# Patient Record
Sex: FEMALE
[De-identification: names, ages, dates, MRNs, and addresses within clinical notes are randomized per-mention and may not be internally consistent; named-entity substitution may affect disease eponyms.]

---

## 2017-01-14 ENCOUNTER — DIAGNOSTIC TRANS (OUTPATIENT)
Dept: OTHER | Age: 82
End: 2017-01-14

## 2017-01-14 ENCOUNTER — HOSPITAL (OUTPATIENT)
Dept: OTHER | Age: 82
End: 2017-01-14
Attending: HOSPITALIST

## 2017-01-14 LAB
ALBUMIN SERPL-MCNC: 3.5 GM/DL (ref 3.6–5.1)
ALBUMIN/GLOB SERPL: 0.8 {RATIO} (ref 1–2.4)
ALP SERPL-CCNC: 69 UNIT/L (ref 45–117)
ALT SERPL-CCNC: 15 UNIT/L
AMORPH SED URNS QL MICRO: ABNORMAL
ANALYZER ANC (IANC): ABNORMAL
ANION GAP SERPL CALC-SCNC: 11 MMOL/L (ref 10–20)
APPEARANCE UR: ABNORMAL
AST SERPL-CCNC: 14 UNIT/L
BACTERIA #/AREA URNS HPF: ABNORMAL /HPF
BASOPHILS # BLD: 0 THOUSAND/MCL (ref 0–0.3)
BASOPHILS NFR BLD: 0 %
BILIRUB SERPL-MCNC: 0.4 MG/DL (ref 0.2–1)
BILIRUB UR QL: NEGATIVE
BNP SERPL-MCNC: 231 PG/ML
BUN SERPL-MCNC: 9 MG/DL (ref 10–20)
BUN/CREAT SERPL: 13 (ref 7–25)
CALCIUM SERPL-MCNC: 8.5 MG/DL (ref 8.4–10.2)
CAOX CRY URNS QL MICRO: ABNORMAL
CHLORIDE: 95 MMOL/L (ref 98–107)
CO2 SERPL-SCNC: 31 MMOL/L (ref 21–32)
COLOR UR: YELLOW
CREAT SERPL-MCNC: 0.68 MG/DL (ref 0.51–0.95)
DIFFERENTIAL METHOD BLD: ABNORMAL
EOSINOPHIL # BLD: 0 THOUSAND/MCL (ref 0.1–0.5)
EOSINOPHIL NFR BLD: 0 %
EPITH CASTS #/AREA URNS LPF: ABNORMAL /[LPF]
ERYTHROCYTE [DISTWIDTH] IN BLOOD: 13.3 % (ref 11–15)
ERYTHROCYTE [SEDIMENTATION RATE] IN BLOOD BY WESTERGREN METHOD: 28 MM/HR (ref 0–20)
FATTY CASTS #/AREA URNS LPF: ABNORMAL /[LPF]
GLOBULIN SER-MCNC: 4.2 GM/DL (ref 2–4)
GLUCOSE SERPL-MCNC: 123 MG/DL (ref 65–99)
GLUCOSE UR-MCNC: NEGATIVE MG/DL
GRAN CASTS #/AREA URNS LPF: ABNORMAL /[LPF]
HEMATOCRIT: 41.9 % (ref 36–46.5)
HGB BLD-MCNC: 13.9 GM/DL (ref 12–15.5)
HGB UR QL: NEGATIVE
HYALINE CASTS #/AREA URNS LPF: ABNORMAL /LPF (ref 0–5)
KETONES UR-MCNC: NEGATIVE MG/DL
LEUKOCYTE ESTERASE UR QL STRIP: NEGATIVE
LYMPHOCYTES # BLD: 1.1 THOUSAND/MCL (ref 1–4)
LYMPHOCYTES NFR BLD: 10 %
MCH RBC QN AUTO: 28.7 PG (ref 26–34)
MCHC RBC AUTO-ENTMCNC: 33.2 GM/DL (ref 32–36.5)
MCV RBC AUTO: 86.6 FL (ref 78–100)
MIXED CELL CASTS #/AREA URNS LPF: ABNORMAL /[LPF]
MONOCYTES # BLD: 0.5 THOUSAND/MCL (ref 0.3–0.9)
MONOCYTES NFR BLD: 4 %
MUCOUS THREADS URNS QL MICRO: PRESENT
NEUTROPHILS # BLD: 10.2 THOUSAND/MCL (ref 1.8–7.7)
NEUTROPHILS NFR BLD: 86 %
NEUTS SEG NFR BLD: ABNORMAL %
NITRITE UR QL: NEGATIVE
PERCENT NRBC: ABNORMAL
PH UR: 7 UNIT (ref 5–7)
PLATELET # BLD: 277 THOUSAND/MCL (ref 140–450)
POTASSIUM SERPL-SCNC: 3.4 MMOL/L (ref 3.4–5.1)
PROT SERPL-MCNC: 7.7 GM/DL (ref 6.4–8.2)
PROT UR QL: 100 MG/DL
RBC # BLD: 4.84 MILLION/MCL (ref 4–5.2)
RBC #/AREA URNS HPF: ABNORMAL /HPF (ref 0–3)
RBC CASTS #/AREA URNS LPF: ABNORMAL /[LPF]
RENAL EPI CELLS #/AREA URNS HPF: ABNORMAL /[HPF]
SODIUM SERPL-SCNC: 134 MMOL/L (ref 135–145)
SP GR UR: 1.02 (ref 1–1.03)
SPECIMEN SOURCE: ABNORMAL
SPERM URNS QL MICRO: ABNORMAL
SQUAMOUS #/AREA URNS HPF: ABNORMAL /HPF (ref 0–5)
T VAGINALIS URNS QL MICRO: ABNORMAL
TRI-PHOS CRY URNS QL MICRO: ABNORMAL
URATE CRY URNS QL MICRO: ABNORMAL
URINE REFLEX: ABNORMAL
URNS CMNT MICRO: ABNORMAL
UROBILINOGEN UR QL: 0.2 MG/DL (ref 0–1)
WAXY CASTS #/AREA URNS LPF: ABNORMAL /[LPF]
WBC # BLD: 11.8 THOUSAND/MCL (ref 4.2–11)
WBC #/AREA URNS HPF: ABNORMAL /HPF (ref 0–5)
WBC CASTS #/AREA URNS LPF: ABNORMAL /[LPF]
YEAST HYPHAE URNS QL MICRO: ABNORMAL
YEAST URNS QL MICRO: ABNORMAL

## 2017-01-15 LAB
ANALYZER ANC (IANC): ABNORMAL
ANION GAP SERPL CALC-SCNC: 11 MMOL/L (ref 10–20)
BASOPHILS # BLD: 0 THOUSAND/MCL (ref 0–0.3)
BASOPHILS NFR BLD: 0 %
BUN SERPL-MCNC: 10 MG/DL (ref 10–20)
BUN/CREAT SERPL: 13 (ref 7–25)
CALCIUM SERPL-MCNC: 8.3 MG/DL (ref 8.4–10.2)
CHLORIDE: 99 MMOL/L (ref 98–107)
CO2 SERPL-SCNC: 29 MMOL/L (ref 21–32)
CREAT SERPL-MCNC: 0.76 MG/DL (ref 0.51–0.95)
DIFFERENTIAL METHOD BLD: ABNORMAL
EOSINOPHIL # BLD: 0.1 THOUSAND/MCL (ref 0.1–0.5)
EOSINOPHIL NFR BLD: 1 %
ERYTHROCYTE [DISTWIDTH] IN BLOOD: 13.9 % (ref 11–15)
GLUCOSE SERPL-MCNC: 111 MG/DL (ref 65–99)
HEMATOCRIT: 37.4 % (ref 36–46.5)
HGB BLD-MCNC: 12 GM/DL (ref 12–15.5)
LYMPHOCYTES # BLD: 2.8 THOUSAND/MCL (ref 1–4)
LYMPHOCYTES NFR BLD: 28 %
MAGNESIUM SERPL-MCNC: 1.9 MG/DL (ref 1.7–2.4)
MCH RBC QN AUTO: 28.2 PG (ref 26–34)
MCHC RBC AUTO-ENTMCNC: 32.1 GM/DL (ref 32–36.5)
MCV RBC AUTO: 87.8 FL (ref 78–100)
MONOCYTES # BLD: 1.2 THOUSAND/MCL (ref 0.3–0.9)
MONOCYTES NFR BLD: 12 %
NEUTROPHILS # BLD: 5.8 THOUSAND/MCL (ref 1.8–7.7)
NEUTROPHILS NFR BLD: 59 %
NEUTS SEG NFR BLD: ABNORMAL %
PERCENT NRBC: ABNORMAL
PLATELET # BLD: 232 THOUSAND/MCL (ref 140–450)
POTASSIUM SERPL-SCNC: 3.4 MMOL/L (ref 3.4–5.1)
RBC # BLD: 4.26 MILLION/MCL (ref 4–5.2)
SODIUM SERPL-SCNC: 136 MMOL/L (ref 135–145)
WBC # BLD: 9.9 THOUSAND/MCL (ref 4.2–11)

## 2017-01-24 PROBLEM — G43.801 OCULAR MIGRAINE WITH STATUS MIGRAINOSUS: Status: ACTIVE | Noted: 2017-01-24

## 2017-01-24 PROBLEM — I67.9 CEREBRAL VASCULAR DISEASE: Status: ACTIVE | Noted: 2017-01-24

## 2017-01-29 ENCOUNTER — HOSPITAL (OUTPATIENT)
Dept: OTHER | Age: 82
End: 2017-01-29
Attending: INTERNAL MEDICINE

## 2017-01-29 LAB
AMORPH SED URNS QL MICRO: ABNORMAL
ANALYZER ANC (IANC): NORMAL
ANION GAP SERPL CALC-SCNC: 12 MMOL/L (ref 10–20)
APPEARANCE UR: CLEAR
BACTERIA #/AREA URNS HPF: ABNORMAL /HPF
BASOPHILS # BLD: 0 THOUSAND/MCL (ref 0–0.3)
BASOPHILS NFR BLD: 1 %
BILIRUB UR QL: NEGATIVE
BUN SERPL-MCNC: 14 MG/DL (ref 10–20)
BUN/CREAT SERPL: 19 (ref 7–25)
CALCIUM SERPL-MCNC: 8.8 MG/DL (ref 8.4–10.2)
CAOX CRY URNS QL MICRO: ABNORMAL
CHLORIDE: 104 MMOL/L (ref 98–107)
CO2 SERPL-SCNC: 30 MMOL/L (ref 21–32)
COLOR UR: YELLOW
CREAT SERPL-MCNC: 0.75 MG/DL (ref 0.51–0.95)
DIFFERENTIAL METHOD BLD: NORMAL
EOSINOPHIL # BLD: 0.3 THOUSAND/MCL (ref 0.1–0.5)
EOSINOPHIL NFR BLD: 3 %
EPITH CASTS #/AREA URNS LPF: ABNORMAL /[LPF]
ERYTHROCYTE [DISTWIDTH] IN BLOOD: 13.8 % (ref 11–15)
FATTY CASTS #/AREA URNS LPF: ABNORMAL /[LPF]
GLUCOSE BLDC GLUCOMTR-MCNC: 122 MG/DL (ref 65–99)
GLUCOSE SERPL-MCNC: 125 MG/DL (ref 65–99)
GLUCOSE UR-MCNC: NEGATIVE MG/DL
GRAN CASTS #/AREA URNS LPF: ABNORMAL /[LPF]
HEMATOCRIT: 41.7 % (ref 36–46.5)
HGB BLD-MCNC: 13.7 GM/DL (ref 12–15.5)
HGB UR QL: NEGATIVE
HYALINE CASTS #/AREA URNS LPF: ABNORMAL /LPF (ref 0–5)
KETONES UR-MCNC: NEGATIVE MG/DL
LEUKOCYTE ESTERASE UR QL STRIP: ABNORMAL
LYMPHOCYTES # BLD: 2.1 THOUSAND/MCL (ref 1–4)
LYMPHOCYTES NFR BLD: 24 %
MCH RBC QN AUTO: 29 PG (ref 26–34)
MCHC RBC AUTO-ENTMCNC: 32.9 GM/DL (ref 32–36.5)
MCV RBC AUTO: 88.3 FL (ref 78–100)
MIXED CELL CASTS #/AREA URNS LPF: ABNORMAL /[LPF]
MONOCYTES # BLD: 0.9 THOUSAND/MCL (ref 0.3–0.9)
MONOCYTES NFR BLD: 10 %
MUCOUS THREADS URNS QL MICRO: PRESENT
NEUTROPHILS # BLD: 5.5 THOUSAND/MCL (ref 1.8–7.7)
NEUTROPHILS NFR BLD: 62 %
NEUTS SEG NFR BLD: NORMAL %
NITRITE UR QL: NEGATIVE
PERCENT NRBC: NORMAL
PH UR: 6 UNIT (ref 5–7)
PLATELET # BLD: 252 THOUSAND/MCL (ref 140–450)
POTASSIUM SERPL-SCNC: 3.7 MMOL/L (ref 3.4–5.1)
PROT UR QL: NEGATIVE MG/DL
RBC # BLD: 4.72 MILLION/MCL (ref 4–5.2)
RBC #/AREA URNS HPF: ABNORMAL /HPF (ref 0–3)
RBC CASTS #/AREA URNS LPF: ABNORMAL /[LPF]
RENAL EPI CELLS #/AREA URNS HPF: ABNORMAL /[HPF]
SODIUM SERPL-SCNC: 142 MMOL/L (ref 135–145)
SP GR UR: 1.01 (ref 1–1.03)
SPECIMEN SOURCE: ABNORMAL
SPERM URNS QL MICRO: ABNORMAL
SQUAMOUS #/AREA URNS HPF: ABNORMAL /HPF (ref 0–5)
T VAGINALIS URNS QL MICRO: ABNORMAL
TRI-PHOS CRY URNS QL MICRO: ABNORMAL
TSH SERPL-ACNC: 3.78 MCUNIT/ML (ref 0.35–5)
URATE CRY URNS QL MICRO: ABNORMAL
URINE REFLEX: ABNORMAL
URNS CMNT MICRO: ABNORMAL
UROBILINOGEN UR QL: 0.2 MG/DL (ref 0–1)
WAXY CASTS #/AREA URNS LPF: ABNORMAL /[LPF]
WBC # BLD: 8.8 THOUSAND/MCL (ref 4.2–11)
WBC #/AREA URNS HPF: ABNORMAL /HPF (ref 0–5)
WBC CASTS #/AREA URNS LPF: ABNORMAL /[LPF]
YEAST HYPHAE URNS QL MICRO: ABNORMAL
YEAST URNS QL MICRO: ABNORMAL

## 2017-01-30 ENCOUNTER — DIAGNOSTIC TRANS (OUTPATIENT)
Dept: OTHER | Age: 82
End: 2017-01-30

## 2017-01-30 LAB
ANALYZER ANC (IANC): NORMAL
ANION GAP SERPL CALC-SCNC: 10 MMOL/L (ref 10–20)
BASOPHILS # BLD: 0 THOUSAND/MCL (ref 0–0.3)
BASOPHILS NFR BLD: 0 %
BUN SERPL-MCNC: 12 MG/DL (ref 10–20)
BUN/CREAT SERPL: 18 (ref 7–25)
CALCIUM SERPL-MCNC: 8.9 MG/DL (ref 8.4–10.2)
CHLORIDE: 105 MMOL/L (ref 98–107)
CHOLEST SERPL-MCNC: 133 MG/DL
CHOLEST/HDLC SERPL: 2.3 {RATIO}
CO2 SERPL-SCNC: 31 MMOL/L (ref 21–32)
CREAT SERPL-MCNC: 0.65 MG/DL (ref 0.51–0.95)
DIFFERENTIAL METHOD BLD: NORMAL
EOSINOPHIL # BLD: 0.3 THOUSAND/MCL (ref 0.1–0.5)
EOSINOPHIL NFR BLD: 4 %
ERYTHROCYTE [DISTWIDTH] IN BLOOD: 13.9 % (ref 11–15)
ERYTHROCYTE [SEDIMENTATION RATE] IN BLOOD BY WESTERGREN METHOD: 25 MM/HR (ref 0–20)
GLUCOSE SERPL-MCNC: 124 MG/DL (ref 65–99)
HDLC SERPL-MCNC: 58 MG/DL
HEMATOCRIT: 40.9 % (ref 36–46.5)
HGB BLD-MCNC: 13.1 GM/DL (ref 12–15.5)
LDLC SERPL CALC-MCNC: 61 MG/DL
LYMPHOCYTES # BLD: 2.2 THOUSAND/MCL (ref 1–4)
LYMPHOCYTES NFR BLD: 27 %
MCH RBC QN AUTO: 28.4 PG (ref 26–34)
MCHC RBC AUTO-ENTMCNC: 32 GM/DL (ref 32–36.5)
MCV RBC AUTO: 88.5 FL (ref 78–100)
MONOCYTES # BLD: 0.9 THOUSAND/MCL (ref 0.3–0.9)
MONOCYTES NFR BLD: 11 %
NEUTROPHILS # BLD: 4.7 THOUSAND/MCL (ref 1.8–7.7)
NEUTROPHILS NFR BLD: 58 %
NEUTS SEG NFR BLD: NORMAL %
NONHDLC SERPL-MCNC: 75 MG/DL
PERCENT NRBC: NORMAL
PLATELET # BLD: 246 THOUSAND/MCL (ref 140–450)
POTASSIUM SERPL-SCNC: 3.9 MMOL/L (ref 3.4–5.1)
RBC # BLD: 4.62 MILLION/MCL (ref 4–5.2)
SODIUM SERPL-SCNC: 142 MMOL/L (ref 135–145)
TRIGL SERPL-MCNC: 69 MG/DL
WBC # BLD: 8.1 THOUSAND/MCL (ref 4.2–11)

## 2017-01-31 LAB
ALDOST/RENIN PLAS-RTO: 21.6 {RATIO}
ALDOSTERONE, SERUM: 6.5
ANALYZER ANC (IANC): NORMAL
ANION GAP SERPL CALC-SCNC: 11 MMOL/L (ref 10–20)
BASOPHILS # BLD: 0 THOUSAND/MCL (ref 0–0.3)
BASOPHILS NFR BLD: 0 %
BUN SERPL-MCNC: 14 MG/DL (ref 10–20)
BUN/CREAT SERPL: 19 (ref 7–25)
CALCIUM SERPL-MCNC: 8.8 MG/DL (ref 8.4–10.2)
CHLORIDE: 100 MMOL/L (ref 98–107)
CO2 SERPL-SCNC: 30 MMOL/L (ref 21–32)
CREAT SERPL-MCNC: 0.72 MG/DL (ref 0.51–0.95)
DIFFERENTIAL METHOD BLD: NORMAL
EOSINOPHIL # BLD: 0.2 THOUSAND/MCL (ref 0.1–0.5)
EOSINOPHIL NFR BLD: 1 %
ERYTHROCYTE [DISTWIDTH] IN BLOOD: 14.3 % (ref 11–15)
GLUCOSE SERPL-MCNC: 123 MG/DL (ref 65–99)
HEMATOCRIT: 41 % (ref 36–46.5)
HGB BLD-MCNC: 13.2 GM/DL (ref 12–15.5)
LYMPHOCYTES # BLD: 2.5 THOUSAND/MCL (ref 1–4)
LYMPHOCYTES NFR BLD: 23 %
MCH RBC QN AUTO: 28.1 PG (ref 26–34)
MCHC RBC AUTO-ENTMCNC: 32.2 GM/DL (ref 32–36.5)
MCV RBC AUTO: 87.4 FL (ref 78–100)
MONOCYTES # BLD: 0.8 THOUSAND/MCL (ref 0.3–0.9)
MONOCYTES NFR BLD: 7 %
NEUTROPHILS # BLD: 7.3 THOUSAND/MCL (ref 1.8–7.7)
NEUTROPHILS NFR BLD: 69 %
NEUTS SEG NFR BLD: NORMAL %
ORGANIC ACIDS/CREAT UR-SRTO: 215.98 MG/DL
PERCENT NRBC: NORMAL
PLATELET # BLD: 267 THOUSAND/MCL (ref 140–450)
POTASSIUM SERPL-SCNC: 3.4 MMOL/L (ref 3.4–5.1)
PROT UR-MCNC: 32 MG/DL
PROT/CREAT UR: 148 MG/GM CREAT
RBC # BLD: 4.69 MILLION/MCL (ref 4–5.2)
RENIN ACTIVITY: 0.3
SODIUM SERPL-SCNC: 138 MMOL/L (ref 135–145)
WBC # BLD: 10.7 THOUSAND/MCL (ref 4.2–11)

## 2017-02-01 ENCOUNTER — HOSPITAL (OUTPATIENT)
Dept: OTHER | Age: 82
End: 2017-02-01
Attending: EMERGENCY MEDICINE

## 2017-02-01 ENCOUNTER — DIAGNOSTIC TRANS (OUTPATIENT)
Dept: OTHER | Age: 82
End: 2017-02-01

## 2017-02-01 LAB
ANALYZER ANC (IANC): ABNORMAL
ANION GAP SERPL CALC-SCNC: 14 MMOL/L (ref 10–20)
BASOPHILS # BLD: 0.1 THOUSAND/MCL (ref 0–0.3)
BASOPHILS NFR BLD: 0 %
BUN SERPL-MCNC: 23 MG/DL (ref 10–20)
BUN/CREAT SERPL: 29 (ref 7–25)
CALCIUM SERPL-MCNC: 9.2 MG/DL (ref 8.4–10.2)
CHLORIDE: 98 MMOL/L (ref 98–107)
CO2 SERPL-SCNC: 29 MMOL/L (ref 21–32)
CREAT SERPL-MCNC: 0.8 MG/DL (ref 0.51–0.95)
DIFFERENTIAL METHOD BLD: ABNORMAL
EOSINOPHIL # BLD: 0.3 THOUSAND/MCL (ref 0.1–0.5)
EOSINOPHIL NFR BLD: 3 %
ERYTHROCYTE [DISTWIDTH] IN BLOOD: 14.4 % (ref 11–15)
GLUCOSE SERPL-MCNC: 115 MG/DL (ref 65–99)
HEMATOCRIT: 43.5 % (ref 36–46.5)
HGB BLD-MCNC: 14.4 GM/DL (ref 12–15.5)
LYMPHOCYTES # BLD: 3.3 THOUSAND/MCL (ref 1–4)
LYMPHOCYTES NFR BLD: 29 %
MCH RBC QN AUTO: 29.2 PG (ref 26–34)
MCHC RBC AUTO-ENTMCNC: 33.1 GM/DL (ref 32–36.5)
MCV RBC AUTO: 88.2 FL (ref 78–100)
MONOCYTES # BLD: 1.2 THOUSAND/MCL (ref 0.3–0.9)
MONOCYTES NFR BLD: 11 %
NEUTROPHILS # BLD: 6.6 THOUSAND/MCL (ref 1.8–7.7)
NEUTROPHILS NFR BLD: 57 %
NEUTS SEG NFR BLD: ABNORMAL %
PERCENT NRBC: ABNORMAL
PLATELET # BLD: 287 THOUSAND/MCL (ref 140–450)
POTASSIUM SERPL-SCNC: 3.9 MMOL/L (ref 3.4–5.1)
RBC # BLD: 4.93 MILLION/MCL (ref 4–5.2)
SODIUM SERPL-SCNC: 137 MMOL/L (ref 135–145)
TROPONIN I SERPL HS-MCNC: 0.02 NG/ML
WBC # BLD: 11.5 THOUSAND/MCL (ref 4.2–11)

## 2017-02-14 PROBLEM — I70.90: Status: ACTIVE | Noted: 2017-02-14

## 2017-03-21 PROBLEM — T46.4X5A COUGH DUE TO ACE INHIBITOR: Status: ACTIVE | Noted: 2017-03-21

## 2017-03-21 PROBLEM — R05.8 COUGH DUE TO ACE INHIBITOR: Status: ACTIVE | Noted: 2017-03-21

## 2017-07-25 PROBLEM — E55.9 VITAMIN D DEFICIENCY: Status: ACTIVE | Noted: 2017-07-25

## 2017-09-12 PROCEDURE — 87086 URINE CULTURE/COLONY COUNT: CPT | Performed by: INTERNAL MEDICINE

## 2017-09-12 PROCEDURE — 81001 URINALYSIS AUTO W/SCOPE: CPT | Performed by: INTERNAL MEDICINE

## 2018-07-23 PROBLEM — M17.0 BILATERAL PRIMARY OSTEOARTHRITIS OF KNEE: Status: ACTIVE | Noted: 2018-07-23

## 2018-08-16 PROBLEM — Z66 DNR (DO NOT RESUSCITATE): Status: ACTIVE | Noted: 2018-08-16

## 2018-08-16 PROBLEM — F33.41 RECURRENT MAJOR DEPRESSIVE DISORDER, IN PARTIAL REMISSION (HCC): Status: ACTIVE | Noted: 2018-08-16

## 2019-05-24 PROBLEM — R06.02 SOB (SHORTNESS OF BREATH): Status: ACTIVE | Noted: 2019-05-24

## 2020-02-28 PROBLEM — M17.0 PRIMARY OSTEOARTHRITIS OF BOTH KNEES: Status: ACTIVE | Noted: 2020-02-28

## 2020-09-10 PROBLEM — I49.5 TACHYCARDIA-BRADYCARDIA SYNDROME (HCC): Status: ACTIVE | Noted: 2020-09-10

## 2021-05-17 PROBLEM — R06.02 SOB (SHORTNESS OF BREATH): Status: RESOLVED | Noted: 2019-05-24 | Resolved: 2021-05-17

## 2021-05-17 PROBLEM — I70.90: Status: RESOLVED | Noted: 2017-02-14 | Resolved: 2021-05-17

## 2021-05-17 PROBLEM — I67.9 CEREBRAL VASCULAR DISEASE: Status: RESOLVED | Noted: 2017-01-24 | Resolved: 2021-05-17

## 2021-08-27 PROBLEM — R06.02 SHORTNESS OF BREATH: Status: ACTIVE | Noted: 2019-05-24

## 2024-07-01 ENCOUNTER — APPOINTMENT (OUTPATIENT)
Dept: CV DIAGNOSTICS | Facility: HOSPITAL | Age: 89
End: 2024-07-01
Attending: INTERNAL MEDICINE
Payer: MEDICARE

## 2024-07-01 ENCOUNTER — APPOINTMENT (OUTPATIENT)
Dept: CT IMAGING | Facility: HOSPITAL | Age: 89
End: 2024-07-01
Attending: EMERGENCY MEDICINE
Payer: MEDICARE

## 2024-07-01 ENCOUNTER — APPOINTMENT (OUTPATIENT)
Dept: ULTRASOUND IMAGING | Facility: HOSPITAL | Age: 89
End: 2024-07-01
Attending: EMERGENCY MEDICINE
Payer: MEDICARE

## 2024-07-01 ENCOUNTER — HOSPITAL ENCOUNTER (INPATIENT)
Facility: HOSPITAL | Age: 89
LOS: 5 days | Discharge: NURSING, REHAB OR LONG-TERM CARE | End: 2024-07-06
Attending: EMERGENCY MEDICINE | Admitting: EMERGENCY MEDICINE
Payer: MEDICARE

## 2024-07-01 ENCOUNTER — APPOINTMENT (OUTPATIENT)
Dept: GENERAL RADIOLOGY | Facility: HOSPITAL | Age: 89
End: 2024-07-01
Attending: EMERGENCY MEDICINE
Payer: MEDICARE

## 2024-07-01 DIAGNOSIS — M79.89 LEG SWELLING: ICD-10-CM

## 2024-07-01 DIAGNOSIS — R06.02 SHORTNESS OF BREATH: Primary | ICD-10-CM

## 2024-07-01 DIAGNOSIS — J90 PLEURAL EFFUSION ON RIGHT: ICD-10-CM

## 2024-07-01 DIAGNOSIS — R09.02 HYPOXIA: ICD-10-CM

## 2024-07-01 DIAGNOSIS — M17.0 BILATERAL PRIMARY OSTEOARTHRITIS OF KNEE: ICD-10-CM

## 2024-07-01 PROBLEM — I50.9 ACUTE EXACERBATION OF CHF (CONGESTIVE HEART FAILURE) (HCC): Status: ACTIVE | Noted: 2024-07-01

## 2024-07-01 LAB
ANION GAP SERPL CALC-SCNC: 7 MMOL/L (ref 0–18)
ATRIAL RATE: 150 BPM
ATRIAL RATE: 82 BPM
BASOPHILS # BLD AUTO: 0.04 X10(3) UL (ref 0–0.2)
BASOPHILS NFR BLD AUTO: 0.4 %
BNP SERPL-MCNC: 289 PG/ML
BUN BLD-MCNC: 25 MG/DL (ref 9–23)
BUN/CREAT SERPL: 23.6 (ref 10–20)
CALCIUM BLD-MCNC: 9.3 MG/DL (ref 8.7–10.4)
CHLORIDE SERPL-SCNC: 102 MMOL/L (ref 98–112)
CO2 SERPL-SCNC: 27 MMOL/L (ref 21–32)
CREAT BLD-MCNC: 1.06 MG/DL
D DIMER PPP FEU-MCNC: 2.69 UG/ML FEU (ref ?–0.96)
DEPRECATED RDW RBC AUTO: 49.1 FL (ref 35.1–46.3)
EGFRCR SERPLBLD CKD-EPI 2021: 48 ML/MIN/1.73M2 (ref 60–?)
EOSINOPHIL # BLD AUTO: 0.14 X10(3) UL (ref 0–0.7)
EOSINOPHIL NFR BLD AUTO: 1.4 %
ERYTHROCYTE [DISTWIDTH] IN BLOOD BY AUTOMATED COUNT: 15.7 % (ref 11–15)
FLUAV + FLUBV RNA SPEC NAA+PROBE: NEGATIVE
FLUAV + FLUBV RNA SPEC NAA+PROBE: NEGATIVE
GLUCOSE BLD-MCNC: 111 MG/DL (ref 70–99)
HCT VFR BLD AUTO: 39.6 %
HGB BLD-MCNC: 13 G/DL
IMM GRANULOCYTES # BLD AUTO: 0.03 X10(3) UL (ref 0–1)
IMM GRANULOCYTES NFR BLD: 0.3 %
LACTATE SERPL-SCNC: 0.9 MMOL/L (ref 0.5–2)
LYMPHOCYTES # BLD AUTO: 1.78 X10(3) UL (ref 1–4)
LYMPHOCYTES NFR BLD AUTO: 17.9 %
MCH RBC QN AUTO: 28.6 PG (ref 26–34)
MCHC RBC AUTO-ENTMCNC: 32.8 G/DL (ref 31–37)
MCV RBC AUTO: 87 FL
MONOCYTES # BLD AUTO: 0.73 X10(3) UL (ref 0.1–1)
MONOCYTES NFR BLD AUTO: 7.3 %
NEUTROPHILS # BLD AUTO: 7.22 X10 (3) UL (ref 1.5–7.7)
NEUTROPHILS # BLD AUTO: 7.22 X10(3) UL (ref 1.5–7.7)
NEUTROPHILS NFR BLD AUTO: 72.7 %
OSMOLALITY SERPL CALC.SUM OF ELEC: 287 MOSM/KG (ref 275–295)
P AXIS: 73 DEGREES
P AXIS: 84 DEGREES
PLATELET # BLD AUTO: 341 10(3)UL (ref 150–450)
POTASSIUM SERPL-SCNC: 4.8 MMOL/L (ref 3.5–5.1)
Q-T INTERVAL: 352 MS
Q-T INTERVAL: 366 MS
QRS DURATION: 82 MS
QRS DURATION: 98 MS
QTC CALCULATION (BEZET): 406 MS
QTC CALCULATION (BEZET): 413 MS
R AXIS: -11 DEGREES
R AXIS: -19 DEGREES
RBC # BLD AUTO: 4.55 X10(6)UL
RSV RNA SPEC NAA+PROBE: NEGATIVE
SARS-COV-2 RNA RESP QL NAA+PROBE: NOT DETECTED
SODIUM SERPL-SCNC: 136 MMOL/L (ref 136–145)
T AXIS: 0 DEGREES
T AXIS: 69 DEGREES
TROPONIN I SERPL HS-MCNC: 17 NG/L
VENTRICULAR RATE: 74 BPM
VENTRICULAR RATE: 83 BPM
WBC # BLD AUTO: 9.9 X10(3) UL (ref 4–11)

## 2024-07-01 PROCEDURE — 96375 TX/PRO/DX INJ NEW DRUG ADDON: CPT

## 2024-07-01 PROCEDURE — 93005 ELECTROCARDIOGRAM TRACING: CPT

## 2024-07-01 PROCEDURE — 80048 BASIC METABOLIC PNL TOTAL CA: CPT | Performed by: EMERGENCY MEDICINE

## 2024-07-01 PROCEDURE — 84484 ASSAY OF TROPONIN QUANT: CPT | Performed by: EMERGENCY MEDICINE

## 2024-07-01 PROCEDURE — 83880 ASSAY OF NATRIURETIC PEPTIDE: CPT | Performed by: EMERGENCY MEDICINE

## 2024-07-01 PROCEDURE — 93970 EXTREMITY STUDY: CPT | Performed by: EMERGENCY MEDICINE

## 2024-07-01 PROCEDURE — 99285 EMERGENCY DEPT VISIT HI MDM: CPT

## 2024-07-01 PROCEDURE — 71045 X-RAY EXAM CHEST 1 VIEW: CPT | Performed by: EMERGENCY MEDICINE

## 2024-07-01 PROCEDURE — 87040 BLOOD CULTURE FOR BACTERIA: CPT | Performed by: EMERGENCY MEDICINE

## 2024-07-01 PROCEDURE — 96365 THER/PROPH/DIAG IV INF INIT: CPT

## 2024-07-01 PROCEDURE — 93306 TTE W/DOPPLER COMPLETE: CPT | Performed by: INTERNAL MEDICINE

## 2024-07-01 PROCEDURE — 71260 CT THORAX DX C+: CPT | Performed by: EMERGENCY MEDICINE

## 2024-07-01 PROCEDURE — 85025 COMPLETE CBC W/AUTO DIFF WBC: CPT | Performed by: EMERGENCY MEDICINE

## 2024-07-01 PROCEDURE — 93010 ELECTROCARDIOGRAM REPORT: CPT | Performed by: INTERNAL MEDICINE

## 2024-07-01 PROCEDURE — 93010 ELECTROCARDIOGRAM REPORT: CPT

## 2024-07-01 PROCEDURE — 85379 FIBRIN DEGRADATION QUANT: CPT | Performed by: EMERGENCY MEDICINE

## 2024-07-01 PROCEDURE — 0241U SARS-COV-2/FLU A AND B/RSV BY PCR (GENEXPERT): CPT | Performed by: EMERGENCY MEDICINE

## 2024-07-01 PROCEDURE — 83605 ASSAY OF LACTIC ACID: CPT | Performed by: EMERGENCY MEDICINE

## 2024-07-01 RX ORDER — ASPIRIN 81 MG/1
81 TABLET, CHEWABLE ORAL DAILY
COMMUNITY
End: 2024-07-06

## 2024-07-01 RX ORDER — HYDROMORPHONE HYDROCHLORIDE 1 MG/ML
0.8 INJECTION, SOLUTION INTRAMUSCULAR; INTRAVENOUS; SUBCUTANEOUS EVERY 2 HOUR PRN
Status: DISCONTINUED | OUTPATIENT
Start: 2024-07-01 | End: 2024-07-02

## 2024-07-01 RX ORDER — ASPIRIN 81 MG/1
81 TABLET, CHEWABLE ORAL DAILY
Status: DISCONTINUED | OUTPATIENT
Start: 2024-07-01 | End: 2024-07-05

## 2024-07-01 RX ORDER — SENNOSIDES 8.6 MG
17.2 TABLET ORAL NIGHTLY PRN
Status: DISCONTINUED | OUTPATIENT
Start: 2024-07-01 | End: 2024-07-06

## 2024-07-01 RX ORDER — DOCUSATE SODIUM 100 MG/1
100 CAPSULE, LIQUID FILLED ORAL 2 TIMES DAILY
Status: DISCONTINUED | OUTPATIENT
Start: 2024-07-01 | End: 2024-07-06

## 2024-07-01 RX ORDER — ENEMA 19; 7 G/133ML; G/133ML
1 ENEMA RECTAL ONCE AS NEEDED
Status: DISCONTINUED | OUTPATIENT
Start: 2024-07-01 | End: 2024-07-06

## 2024-07-01 RX ORDER — AMLODIPINE BESYLATE 5 MG/1
5 TABLET ORAL 2 TIMES DAILY
Status: DISCONTINUED | OUTPATIENT
Start: 2024-07-01 | End: 2024-07-06

## 2024-07-01 RX ORDER — SPIRONOLACTONE 25 MG/1
25 TABLET ORAL DAILY
COMMUNITY

## 2024-07-01 RX ORDER — PANTOPRAZOLE SODIUM 20 MG/1
20 TABLET, DELAYED RELEASE ORAL
Status: DISCONTINUED | OUTPATIENT
Start: 2024-07-01 | End: 2024-07-06

## 2024-07-01 RX ORDER — FUROSEMIDE 20 MG/1
20 TABLET ORAL DAILY
Status: ON HOLD | COMMUNITY
End: 2024-07-06

## 2024-07-01 RX ORDER — ESCITALOPRAM OXALATE 10 MG/1
10 TABLET ORAL DAILY
Status: DISCONTINUED | OUTPATIENT
Start: 2024-07-01 | End: 2024-07-06

## 2024-07-01 RX ORDER — BISACODYL 10 MG
10 SUPPOSITORY, RECTAL RECTAL
Status: DISCONTINUED | OUTPATIENT
Start: 2024-07-01 | End: 2024-07-06

## 2024-07-01 RX ORDER — ONDANSETRON 2 MG/ML
4 INJECTION INTRAMUSCULAR; INTRAVENOUS EVERY 6 HOURS PRN
Status: DISCONTINUED | OUTPATIENT
Start: 2024-07-01 | End: 2024-07-06

## 2024-07-01 RX ORDER — HYDROMORPHONE HYDROCHLORIDE 1 MG/ML
0.2 INJECTION, SOLUTION INTRAMUSCULAR; INTRAVENOUS; SUBCUTANEOUS EVERY 2 HOUR PRN
Status: DISCONTINUED | OUTPATIENT
Start: 2024-07-01 | End: 2024-07-02

## 2024-07-01 RX ORDER — CLOPIDOGREL BISULFATE 75 MG/1
75 TABLET ORAL DAILY
Status: DISCONTINUED | OUTPATIENT
Start: 2024-07-01 | End: 2024-07-05

## 2024-07-01 RX ORDER — FUROSEMIDE 10 MG/ML
40 INJECTION INTRAMUSCULAR; INTRAVENOUS ONCE
Status: COMPLETED | OUTPATIENT
Start: 2024-07-01 | End: 2024-07-01

## 2024-07-01 RX ORDER — HEPARIN SODIUM 5000 [USP'U]/ML
5000 INJECTION, SOLUTION INTRAVENOUS; SUBCUTANEOUS EVERY 8 HOURS SCHEDULED
Status: DISCONTINUED | OUTPATIENT
Start: 2024-07-01 | End: 2024-07-05

## 2024-07-01 RX ORDER — HYDROMORPHONE HYDROCHLORIDE 1 MG/ML
0.4 INJECTION, SOLUTION INTRAMUSCULAR; INTRAVENOUS; SUBCUTANEOUS EVERY 2 HOUR PRN
Status: DISCONTINUED | OUTPATIENT
Start: 2024-07-01 | End: 2024-07-02

## 2024-07-01 RX ORDER — LATANOPROST 50 UG/ML
1 SOLUTION/ DROPS OPHTHALMIC NIGHTLY
Status: DISCONTINUED | OUTPATIENT
Start: 2024-07-01 | End: 2024-07-06

## 2024-07-01 RX ORDER — FUROSEMIDE 10 MG/ML
40 INJECTION INTRAMUSCULAR; INTRAVENOUS
Status: DISCONTINUED | OUTPATIENT
Start: 2024-07-01 | End: 2024-07-06

## 2024-07-01 RX ORDER — ACETAMINOPHEN 500 MG
500 TABLET ORAL EVERY 4 HOURS PRN
Status: DISCONTINUED | OUTPATIENT
Start: 2024-07-01 | End: 2024-07-02

## 2024-07-01 RX ORDER — POLYETHYLENE GLYCOL 3350 17 G/17G
17 POWDER, FOR SOLUTION ORAL DAILY PRN
Status: DISCONTINUED | OUTPATIENT
Start: 2024-07-01 | End: 2024-07-06

## 2024-07-01 RX ORDER — METOPROLOL SUCCINATE 50 MG/1
50 TABLET, EXTENDED RELEASE ORAL DAILY
Status: DISCONTINUED | OUTPATIENT
Start: 2024-07-01 | End: 2024-07-06

## 2024-07-01 RX ORDER — METOCLOPRAMIDE HYDROCHLORIDE 5 MG/ML
10 INJECTION INTRAMUSCULAR; INTRAVENOUS EVERY 8 HOURS PRN
Status: DISCONTINUED | OUTPATIENT
Start: 2024-07-01 | End: 2024-07-06

## 2024-07-01 RX ORDER — MELATONIN
3 NIGHTLY PRN
Status: DISCONTINUED | OUTPATIENT
Start: 2024-07-01 | End: 2024-07-06

## 2024-07-01 NOTE — ED QUICK NOTES
Orders for admission, patient is aware of plan and ready to go upstairs. Any questions, please call ED RN Ronald at extension [phone].     Patient Covid vaccination status: Fully vaccinated     COVID Test Ordered in ED: SARS-CoV-2/Flu A and B/RSV by PCR (GeneXpert)    COVID Suspicion at Admission: N/A    Running Infusions:  None    Mental Status/LOC at time of transport: a/ox4    Other pertinent information:   CIWA score: N/A   NIH score:  N/A

## 2024-07-01 NOTE — H&P
[facility] Hospitalist H&P       CC:   Chief Complaint   Patient presents with    Difficulty Breathing        PCP: Jose E Varghese MD    Date of Admission: 7/1/2024  3:40 AM    ASSESSMENT / PLAN:     Ms. Mayen is a 89 yo F with PMH of HTN, HL, chronic diastolic CHF, recurrent R pleural effusion, CKD3 who presented with shortness of breath.     Acute hypoxic respiratory failure  Acute on chronic diastolic CHF  Recurrent R pleural effusion  - continue IV lasix  - cardiology consulted  - TTE ordered  - CXR with R pleural effusion  - pulm consulted    Abnormal EKG  - ?Aflutter, possible Afib on monitor  - cardiology consult  - monitor on tele    Hx CVA  - ASA/plavix    HTN  - BP stable    HL  - statin    ACP  - CODE- DNR/comfort- confirmed with patient and daughter/POA  - POA- daughter Tammie- updated via phone  - live at home alone with caregiver a few days a week    FN:  - IVF: none  - Diet: cardiac    DVT Prophy: SCD,  HSQ  Lines: PIV    Dispo: pending clinical course    Outpatient records or previous hospital records reviewed.     Further recommendations pending patient's clinical course.  [facility] hospitalist to continue to follow patient while in house    Patient and/or patient's family given opportunity to ask questions and note understanding and agreeing with therapeutic plan as outlined    Sofia Cuenca MD  [facility] Hospitalist  Answering Service number: [phone]    HPI       History of Present Illness:      Ms. Mayen is a 89 yo F with PMH of HTN, HL, chronic diastolic CHF, recurrent R pleural effusion, CKD3 who presented with shortness of breath. Patient states symptoms worsened yesterday. State she has had history of recurrent shortness of breath, noticed symptoms for a few days but really bad yesterday morning. Has had thoracentesis for pleural effusions in the past. Does take a diuretic at home, has been compliant. Lives alone, has a caregiver a few days a week.       PMH  Past Medical History:    Cerebral  vascular disease    HYPERLIPIDEMIA    HYPERTENSION    Ischemic colitis (HCC)    OTHER DISEASES    spastic colon    OTHER DISEASES    diverticulosis    OTHER DISEASES    hypoglycemia with + GTT    Rotator cuff tear arthropathy, left    SEASONAL ALLERGIES    Senile arteriosclerosis        PSH  Past Surgical History:   Procedure Laterality Date    Cataract      left 1999, right 2004    Colonoscopy,biopsy  12/7/16= Ischemic colitis, Diverticulosis, Polyp    Repeat PRN    D & c      Other surgical history      benign breast bx left 1981        ALL:  Allergies   Allergen Reactions    Aceinhibitors [Ace Inhibitors] Coughing        Home Medications:  No outpatient medications have been marked as taking for the 7/1/24 encounter (Hospital Encounter).         Soc Hx  Social History     Tobacco Use    Smoking status: Never    Smokeless tobacco: Never   Substance Use Topics    Alcohol use: Yes     Alcohol/week: 0.8 standard drinks of alcohol     Types: 1 Glasses of wine per week     Comment: 3-4 glasses of wine per month        Fam Hx  Family History   Problem Relation Age of Onset    Heart Disorder Father         MI    Heart Disorder Mother         MI    Heart Disorder Sister         MI       Review of Systems  Comprehensive ROS reviewed and negative except for what's stated above.     OBJECTIVE:  /72   Pulse 74   Temp 98.2 °F (36.8 °C) (Oral)   Resp 23   SpO2 93%     GEN: elderly female in NAD, hard of hearing  HEENT: EOMI  Pulm: decreased breath sounds at bases  CV: RRR, no murmurs  ABD: Soft, non-tender, non-distended, +BS  SKIN: warm, dry, mild erythema bilaterally, chronic venous stasis skin changes  EXT: 1-2+ pitting edema BLE    Diagnostic Data:    CBC/Chem    Recent Labs   Lab 07/01/24  0354   RBC 4.55   HGB 13.0   HCT 39.6   MCV 87.0   MCH 28.6   MCHC 32.8   RDW 15.7*   NEPRELIM 7.22   WBC 9.9   .0         Recent Labs   Lab 07/01/24  0354   *   BUN 25*   CREATSERUM 1.06*   EGFRCR 48*   CA 9.3       K 4.8      CO2 27.0     Lab Results   Component Value Date    WBC 9.9 07/01/2024    HGB 13.0 07/01/2024    HCT 39.6 07/01/2024    .0 07/01/2024    CREATSERUM 1.06 07/01/2024    BUN 25 07/01/2024     07/01/2024    K 4.8 07/01/2024     07/01/2024    CO2 27.0 07/01/2024     07/01/2024    CA 9.3 07/01/2024    DDIMER 2.69 07/01/2024    TROPHS 17 07/01/2024       No results for input(s): \"TROP\" in the last 168 hours.    Additional Diagnostics: ECG:    CXR: image personally reviewed, R pleural effusion    Radiology: CT CHEST PE AORTA (IV ONLY) (CPT=71260)    Result Date: 7/1/2024  CONCLUSION:   1. Moderate right pleural effusion with pleural thickening and enhancement suggestive of empyema.  Moderate right lung atelectasis is seen.  Underlying infection is a possibility.  2. No evidence of acute pulmonary embolism to the 1st subsegmental pulmonary artery level.  3. No aneurysm of the thoracic aorta.  Two penetrating ulcers/small dissections are seen at the proximal and distal descending aorta.  4. Mosaic ground-glass opacities which may indicate pulmonary edema or air trapping.  5. Marked cardiomegaly.  6. Large hiatal hernia. Additional chronic or incidental findings are described in the body of this report.     elm-remote   Dictated by (CST): Jerzy Quigley MD on 7/01/2024 at 7:22 AM     Finalized by (CST): Jerzy Quigley MD on 7/01/2024 at 7:31 AM

## 2024-07-01 NOTE — ED QUICK NOTES
Purewick was not working, patient changed and cleaned, new pure wick placed. Family updated on POC.

## 2024-07-01 NOTE — ED INITIAL ASSESSMENT (HPI)
Pt complaining of SOB since yesterday, worsening tonight. Patient complaining of productive cough. 80s on room air per EMS put on 2L nasal canula

## 2024-07-01 NOTE — CONSULTS
Pulmonary Consult     Assessment / Plan:  Right pleural effusion - s/p thora in the past notable for exudative fluid with negative cytology. Further workup was previously deferred due to lack of symptoms, co-morbidities and age  - pleural effusion is stable when compared to prior imaging from [facility]. No further intervention/workup  HFpEF  - diuresis  Chronic cough  - BD protocol    Ronen Sullivan MD  Pulmonary and Critical Care Medicine      History of Present Illness:   Ms. Mayen is a 89 year old with history of chronic right pleural effusion and HFpEF who we are asked to see for dyspnea. Noted to have worsening dyspnea over the last several days as well as increased LE edema.No fever, chills, chest pain, wheezing, cough.     12 point ROS negative except per HPI.    Past Medical History:    Cerebral vascular disease    HYPERLIPIDEMIA    HYPERTENSION    Ischemic colitis (HCC)    OTHER DISEASES    spastic colon    OTHER DISEASES    diverticulosis    OTHER DISEASES    hypoglycemia with + GTT    Rotator cuff tear arthropathy, left    SEASONAL ALLERGIES    Senile arteriosclerosis       Past Surgical History:   Procedure Laterality Date    Cataract      left 1999, right 2004    Colonoscopy,biopsy  12/7/16= Ischemic colitis, Diverticulosis, Polyp    Repeat PRN    D & c      Other surgical history      benign breast bx left 1981       Medications Prior to Admission   Medication Sig Dispense Refill Last Dose    furosemide 20 MG Oral Tab Take 1 tablet (20 mg total) by mouth daily.   6/30/2024    aspirin 81 MG Oral Chew Tab Chew 1 tablet (81 mg total) by mouth daily.   6/30/2024    spironolactone 25 MG Oral Tab Take 1 tablet (25 mg total) by mouth daily.   6/30/2024    ESCITALOPRAM 10 MG Oral Tab TAKE 1 TABLET BY MOUTH ONCE A DAY 90 tablet 3 6/30/2024    latanoprost 0.005 % Ophthalmic Solution Place 1 drop into both eyes nightly.   6/30/2024    Multiple Vitamins-Minerals (PRESERVISION AREDS 2) Oral Cap Take 1 capsule by  mouth 2 (two) times daily with meals.   6/30/2024    HYDROCORTISONE 2.5 % External Cream APPLY TO AFFECTED AREA TWICE DAILY AS NEEDED 90 g 0 6/30/2024    METOPROLOL SUCCINATE 50 MG Oral Tablet 24 Hr TAKE 1 TABLET BY MOUTH DAILY 90 tablet 0 6/30/2024    AMLODIPINE 5 MG Oral Tab TAKE 1 TABLET BY MOUTH TWICE DAILY 180 tablet 1 6/30/2024    ATORVASTATIN 20 MG Oral Tab TAKE 1 TABLET BY MOUTH DAILY AT BEDTIME 90 tablet 1 6/30/2024    OMEPRAZOLE 20 MG Oral Capsule Delayed Release TAKE 1 CAPSULE BY MOUTH EVERY MORNING 90 capsule 1 6/30/2024    Clopidogrel Bisulfate 75 MG Oral Tab Take 1 tablet (75 mg total) by mouth daily. 90 tablet 3 6/30/2024    M-VIT OR 1 TABLET DAILY   6/30/2024     Outpatient Medications Marked as Taking for the 7/1/24 encounter (Hospital Encounter)   Medication Sig Dispense Refill    furosemide 20 MG Oral Tab Take 1 tablet (20 mg total) by mouth daily.      aspirin 81 MG Oral Chew Tab Chew 1 tablet (81 mg total) by mouth daily.      spironolactone 25 MG Oral Tab Take 1 tablet (25 mg total) by mouth daily.      ESCITALOPRAM 10 MG Oral Tab TAKE 1 TABLET BY MOUTH ONCE A DAY 90 tablet 3    latanoprost 0.005 % Ophthalmic Solution Place 1 drop into both eyes nightly.      Multiple Vitamins-Minerals (PRESERVISION AREDS 2) Oral Cap Take 1 capsule by mouth 2 (two) times daily with meals.      HYDROCORTISONE 2.5 % External Cream APPLY TO AFFECTED AREA TWICE DAILY AS NEEDED 90 g 0    METOPROLOL SUCCINATE 50 MG Oral Tablet 24 Hr TAKE 1 TABLET BY MOUTH DAILY 90 tablet 0    AMLODIPINE 5 MG Oral Tab TAKE 1 TABLET BY MOUTH TWICE DAILY 180 tablet 1    ATORVASTATIN 20 MG Oral Tab TAKE 1 TABLET BY MOUTH DAILY AT BEDTIME 90 tablet 1    OMEPRAZOLE 20 MG Oral Capsule Delayed Release TAKE 1 CAPSULE BY MOUTH EVERY MORNING 90 capsule 1    Clopidogrel Bisulfate 75 MG Oral Tab Take 1 tablet (75 mg total) by mouth daily. 90 tablet 3    M-VIT OR 1 TABLET DAILY        Allergies   Allergen Reactions    Aceinhibitors [Ace  Inhibitors] Coughing      Social History     Socioeconomic History    Marital status:    Tobacco Use    Smoking status: Never    Smokeless tobacco: Never   Substance and Sexual Activity    Alcohol use: Yes     Alcohol/week: 0.8 standard drinks of alcohol     Types: 1 Glasses of wine per week     Comment: 3-4 glasses of wine per month    Drug use: No     Social Determinants of Health     Food Insecurity: No Food Insecurity (7/1/2024)    Food Insecurity     Food Insecurity: Never true   Transportation Needs: No Transportation Needs (7/1/2024)    Transportation Needs     Lack of Transportation: No   Housing Stability: Low Risk  (7/1/2024)    Housing Stability     Housing Instability: No       Family History   Problem Relation Age of Onset    Heart Disorder Father         MI    Heart Disorder Mother         MI    Heart Disorder Sister         MI         Exam:  Vitals:    07/01/24 0830 07/01/24 0900 07/01/24 0920 07/01/24 1030   BP: 127/53 137/60 115/55 141/72   BP Location:   Right arm Right arm   Pulse: 66 53 76 80   Resp: 23 15 16 18   Temp:   98.9 °F (37.2 °C)    TempSrc:   Oral    SpO2: 90% 93% 90% 93%   Weight:    152 lb 8 oz (69.2 kg)   Height:    5' 4.02\" (1.626 m)     General: no apparent distress, conversant  Skin: no rash, ulcers or subcutaneous nodules  Eyes: anicteric sclerae, moist conjunctivae  Head, ears, nose, throat: atraumatic, oropharynx clear with moist mucous membranes  Neck: trachea midline with no thyromegaly  Heart: regular rate and rhythm, no murmurs / rubs / gallops  Lungs: decreased at right lung base, otherwise clear  Extremities: bilateral LE edema  Psych: interactive, answering questions appropriately, appropriate affect    Labs:  Reviewed in EMR    Inpatient Medications:  Reviewed in EMR    Imaging:   Chest imaging reviewed

## 2024-07-01 NOTE — CONSULTS
[facility] CARDIOLOGY CONSULT      Sharyn Mayen is a 89 year old female who I assessed as follows:  Chief Complaint   Patient presents with    Difficulty Breathing          REASON FOR CONSULTATION:    assess for CHF            ASSESSMENT/PLAN:     IMPRESSIONS:  Dyspnea, probably a component of CHF on top of chronic right pleural effusion (exudative in the past)  Acute on chronic diastolic CHF  Atrial tachycardia (slow atrial flutter)  Small \"penetrating ulcers\" noted descending aoarta on CT chest, asymptomatic, unclear significance  HTN, controlled  HL, on statin  History of CVA  Recent herpes zoster        PLAN:  IV lasix  Echo  Plavix for history stroke and atrial arrhythmia. Will try to view ecg 2022 outpatient            --------------------------------------------------------------------------------------------------------------------------------    HPI:         Presents with worsening dyspnea with exertion past few days. LE edema also worse than usual. Has known exudative right pleural effusion but also on lasix for component CHF. No chest pain. Noted to have atrial tach (slow atrial flutter) in ER.     On BP meds. On statin.             No current outpatient medications on file.      Past Medical History:    Cerebral vascular disease    HYPERLIPIDEMIA    HYPERTENSION    Ischemic colitis (HCC)    OTHER DISEASES    spastic colon    OTHER DISEASES    diverticulosis    OTHER DISEASES    hypoglycemia with + GTT    Rotator cuff tear arthropathy, left    SEASONAL ALLERGIES    Senile arteriosclerosis     Past Surgical History:   Procedure Laterality Date    Cataract      left 1999, right 2004    Colonoscopy,biopsy  12/7/16= Ischemic colitis, Diverticulosis, Polyp    Repeat PRN    D & c      Other surgical history      benign breast bx left 1981     Family History   Problem Relation Age of Onset    Heart Disorder Father         MI    Heart Disorder Mother         MI    Heart Disorder Sister         MI       Social History:  Social History     Socioeconomic History    Marital status:    Tobacco Use    Smoking status: Never    Smokeless tobacco: Never   Substance and Sexual Activity    Alcohol use: Yes     Alcohol/week: 0.8 standard drinks of alcohol     Types: 1 Glasses of wine per week     Comment: 3-4 glasses of wine per month    Drug use: No          Medications:  Current Facility-Administered Medications   Medication Dose Route Frequency    amLODIPine (Norvasc) tab 5 mg  5 mg Oral BID    clopidogrel (Plavix) tab 75 mg  75 mg Oral Daily    escitalopram (Lexapro) tab 10 mg  10 mg Oral Daily    latanoprost (Xalatan) 0.005 % ophthalmic solution 1 drop  1 drop Both Eyes Nightly    metoprolol succinate ER (Toprol XL) 24 hr tab 50 mg  50 mg Oral Daily    pantoprazole (Protonix) DR tab 20 mg  20 mg Oral QAM AC    heparin (Porcine) 5000 UNIT/ML injection 5,000 Units  5,000 Units Subcutaneous Q8H JOAN    acetaminophen (Tylenol Extra Strength) tab 500 mg  500 mg Oral Q4H PRN    HYDROmorphone (Dilaudid) 1 MG/ML injection 0.2 mg  0.2 mg Intravenous Q2H PRN    Or    HYDROmorphone (Dilaudid) 1 MG/ML injection 0.4 mg  0.4 mg Intravenous Q2H PRN    Or    HYDROmorphone (Dilaudid) 1 MG/ML injection 0.8 mg  0.8 mg Intravenous Q2H PRN    melatonin tab 3 mg  3 mg Oral Nightly PRN    polyethylene glycol (PEG 3350) (Miralax) 17 g oral packet 17 g  17 g Oral Daily PRN    sennosides (Senokot) tab 17.2 mg  17.2 mg Oral Nightly PRN    bisacodyl (Dulcolax) 10 MG rectal suppository 10 mg  10 mg Rectal Daily PRN    fleet enema (Fleet) 7-19 GM/118ML rectal enema 133 mL  1 enema Rectal Once PRN    ondansetron (Zofran) 4 MG/2ML injection 4 mg  4 mg Intravenous Q6H PRN    metoclopramide (Reglan) 5 mg/mL injection 10 mg  10 mg Intravenous Q8H PRN    furosemide (Lasix) 10 mg/mL injection 40 mg  40 mg Intravenous BID (Diuretic)           Allergies  Allergies   Allergen Reactions    Aceinhibitors [Ace Inhibitors] Coughing               REVIEW  OF SYSTEMS:   GENERAL HEALTH: no fevers  SKIN: denies any unusual skin lesions or rashes   EARS: no recent changes in hearing  EYE: no recent vision changes  THROAT: denies sore throat  RESPIRATORY: dyspnea  CARDIOVASCULAR: denies chest pain, syncope, or palpitations  GI: denies abdominal pain, nausea and vomiting  NEURO: denies headaches and focal neurologic symptoms  PSYCH: no recent depression  ENDOCRINE: no change in sleep pattern  HEME: no easy bruising  All other systems reviewed and negative.          EXAM:         TELE: Atrial tachycardia with variable AV conduction          /55 (BP Location: Right arm)   Pulse 76   Temp 98.2 °F (36.8 °C) (Oral)   Resp 16   SpO2 90%   Temp (24hrs), Av.2 °F (36.8 °C), Min:98.2 °F (36.8 °C), Max:98.2 °F (36.8 °C)    Wt Readings from Last 3 Encounters:   22 181 lb (82.1 kg)   21 177 lb (80.3 kg)   21 177 lb (80.3 kg)       No intake or output data in the 24 hours ending 24      GENERAL: well developed, well nourished, in no apparent distress  SKIN: no rashes  HEENT: atraumatic, normocephalic, throat without erythema  NECK: supple, no bruits  LUNGS: clear to auscultation  CARDIO: RRR without murmur or S3   GI: soft, nontender  EXTREMITIES: 1-2+ LE edema  NEUROLOGY: alert  PSYCH: cooperative          LABORATORY DATA:               ECG:        ECHO:          Labs:  Recent Labs   Lab 24   *   BUN 25*   CREATSERUM 1.06*   CA 9.3      K 4.8      CO2 27.0     No results for input(s): \"TROP\" in the last 168 hours.  Recent Labs   Lab 24   RBC 4.55   HGB 13.0   HCT 39.6   MCV 87.0   MCH 28.6   MCHC 32.8   RDW 15.7*   NEPRELIM 7.22   WBC 9.9   .0     Recent Labs   Lab 24  035   TROPHS 17       Imaging:  US VENOUS DOPPLER LEG BILAT - DIAG IMG (CPT=93970)    Result Date: 2024  CONCLUSION: No evidence of acute deep venous thrombosis in the imaged veins of the bilateral lower extremities.    Preliminary report was given by [facility].  There are no clinically significant discrepancies.    elm-remote   Dictated by (CST): Jerzy Quigley MD on 7/01/2024 at 9:22 AM     Finalized by (CST): Jerzy Quigley MD on 7/01/2024 at 9:23 AM          XR CHEST AP PORTABLE  (CPT=71045)    Result Date: 7/1/2024  CONCLUSION:   Right pleural effusion and associated atelectasis.  Pulmonary edema.    Preliminary report was given by [facility].    elm-remote    Dictated by (CST): Jerzy Quigley MD on 7/01/2024 at 8:46 AM     Finalized by (CST): Jerzy Quigley MD on 7/01/2024 at 8:52 AM          CT CHEST PE AORTA (IV ONLY) (CPT=71260)    Result Date: 7/1/2024  CONCLUSION:   1. Moderate right pleural effusion with pleural thickening and enhancement suggestive of empyema.  Moderate right lung atelectasis is seen.  Underlying infection is a possibility.  2. No evidence of acute pulmonary embolism to the 1st subsegmental pulmonary artery level.  3. No aneurysm of the thoracic aorta.  Two penetrating ulcers/small dissections are seen at the proximal and distal descending aorta.  4. Mosaic ground-glass opacities which may indicate pulmonary edema or air trapping.  5. Marked cardiomegaly.  6. Large hiatal hernia. Additional chronic or incidental findings are described in the body of this report.     elm-remote   Dictated by (CST): Jerzy Quigley MD on 7/01/2024 at 7:22 AM     Finalized by (CST): Jerzy Quigley MD on 7/01/2024 at 7:31 AM            Nuclear stress 2019  - Normal nuclear perfusion study.   - There is no evidence of myocardial ischemia.   - There is no evidence of myocardial infarction.   - Normal regional wall thickening is noted on gated SPECT analysis.   - Normal ejection fraction.         Srinivasa Darby MD

## 2024-07-01 NOTE — PLAN OF CARE
Patient is A&Ox4 can be forgetful, 4Lo2, 1 assist with walker. Continuing IV lasix. Pt had echo see chart for results. Plan for PT/OT eval. Call light within reach and fall precautions in place.     Problem: Patient Centered Care  Goal: Patient preferences are identified and integrated in the patient's plan of care  Description: Interventions:  - What would you like us to know as we care for you? From home alone with caregiver   - Provide timely, complete, and accurate information to patient/family  - Incorporate patient and family knowledge, values, beliefs, and cultural backgrounds into the planning and delivery of care  - Encourage patient/family to participate in care and decision-making at the level they choose  - Honor patient and family perspectives and choices  Outcome: Progressing     Problem: Patient/Family Goals  Goal: Patient/Family Long Term Goal  Description: Patient's Long Term Goal: to go back home    Interventions:  - follow medical regimen  - See additional Care Plan goals for specific interventions  Outcome: Progressing  Goal: Patient/Family Short Term Goal  Description: Patient's Short Term Goal: To not be SOB    Interventions:   - Follow medical regimen  -IV lasix  -cards consult  - See additional Care Plan goals for specific interventions  Outcome: Progressing     Problem: SAFETY ADULT - FALL  Goal: Free from fall injury  Description: INTERVENTIONS:  - Assess pt frequently for physical needs  - Identify cognitive and physical deficits and behaviors that affect risk of falls.  - Gary fall precautions as indicated by assessment.  - Educate pt/family on patient safety including physical limitations  - Instruct pt to call for assistance with activity based on assessment  - Modify environment to reduce risk of injury  - Provide assistive devices as appropriate  - Consider OT/PT consult to assist with strengthening/mobility  - Encourage toileting schedule  Outcome: Progressing     Problem:  RESPIRATORY - ADULT  Goal: Achieves optimal ventilation and oxygenation  Description: INTERVENTIONS:  - Assess for changes in respiratory status  - Assess for changes in mentation and behavior  - Position to facilitate oxygenation and minimize respiratory effort  - Oxygen supplementation based on oxygen saturation or ABGs  - Provide Smoking Cessation handout, if applicable  - Encourage broncho-pulmonary hygiene including cough, deep breathe, Incentive Spirometry  - Assess the need for suctioning and perform as needed  - Assess and instruct to report SOB or any respiratory difficulty  - Respiratory Therapy support as indicated  - Manage/alleviate anxiety  - Monitor for signs/symptoms of CO2 retention  Outcome: Progressing     Problem: CARDIOVASCULAR - ADULT  Goal: Maintains optimal cardiac output and hemodynamic stability  Description: INTERVENTIONS:  - Monitor vital signs, rhythm, and trends  - Monitor for bleeding, hypotension and signs of decreased cardiac output  - Evaluate effectiveness of vasoactive medications to optimize hemodynamic stability  - Monitor arterial and/or venous puncture sites for bleeding and/or hematoma  - Assess quality of pulses, skin color and temperature  - Assess for signs of decreased coronary artery perfusion - ex. Angina  - Evaluate fluid balance, assess for edema, trend weights  Outcome: Progressing  Goal: Absence of cardiac arrhythmias or at baseline  Description: INTERVENTIONS:  - Continuous cardiac monitoring, monitor vital signs, obtain 12 lead EKG if indicated  - Evaluate effectiveness of antiarrhythmic and heart rate control medications as ordered  - Initiate emergency measures for life threatening arrhythmias  - Monitor electrolytes and administer replacement therapy as ordered  Outcome: Progressing

## 2024-07-02 LAB
ALBUMIN SERPL-MCNC: 3.8 G/DL (ref 3.2–4.8)
ALBUMIN/GLOB SERPL: 1.2 {RATIO} (ref 1–2)
ALP LIVER SERPL-CCNC: 86 U/L
ALT SERPL-CCNC: 7 U/L
ANION GAP SERPL CALC-SCNC: 8 MMOL/L (ref 0–18)
ANION GAP SERPL CALC-SCNC: 8 MMOL/L (ref 0–18)
AST SERPL-CCNC: 16 U/L (ref ?–34)
BASOPHILS # BLD AUTO: 0.05 X10(3) UL (ref 0–0.2)
BASOPHILS NFR BLD AUTO: 0.5 %
BILIRUB SERPL-MCNC: 0.8 MG/DL (ref 0.2–0.9)
BUN BLD-MCNC: 27 MG/DL (ref 9–23)
BUN BLD-MCNC: 27 MG/DL (ref 9–23)
BUN/CREAT SERPL: 23.7 (ref 10–20)
BUN/CREAT SERPL: 23.7 (ref 10–20)
CALCIUM BLD-MCNC: 9.1 MG/DL (ref 8.7–10.4)
CALCIUM BLD-MCNC: 9.1 MG/DL (ref 8.7–10.4)
CHLORIDE SERPL-SCNC: 97 MMOL/L (ref 98–112)
CHLORIDE SERPL-SCNC: 97 MMOL/L (ref 98–112)
CO2 SERPL-SCNC: 29 MMOL/L (ref 21–32)
CO2 SERPL-SCNC: 29 MMOL/L (ref 21–32)
CREAT BLD-MCNC: 1.14 MG/DL
CREAT BLD-MCNC: 1.14 MG/DL
DEPRECATED RDW RBC AUTO: 50.2 FL (ref 35.1–46.3)
EGFRCR SERPLBLD CKD-EPI 2021: 44 ML/MIN/1.73M2 (ref 60–?)
EGFRCR SERPLBLD CKD-EPI 2021: 44 ML/MIN/1.73M2 (ref 60–?)
EOSINOPHIL # BLD AUTO: 0.23 X10(3) UL (ref 0–0.7)
EOSINOPHIL NFR BLD AUTO: 2.5 %
ERYTHROCYTE [DISTWIDTH] IN BLOOD BY AUTOMATED COUNT: 15.9 % (ref 11–15)
GLOBULIN PLAS-MCNC: 3.3 G/DL (ref 2–3.5)
GLUCOSE BLD-MCNC: 107 MG/DL (ref 70–99)
GLUCOSE BLD-MCNC: 107 MG/DL (ref 70–99)
HCT VFR BLD AUTO: 34.3 %
HGB BLD-MCNC: 11 G/DL
IMM GRANULOCYTES # BLD AUTO: 0.03 X10(3) UL (ref 0–1)
IMM GRANULOCYTES NFR BLD: 0.3 %
LYMPHOCYTES # BLD AUTO: 1.78 X10(3) UL (ref 1–4)
LYMPHOCYTES NFR BLD AUTO: 19.2 %
MAGNESIUM SERPL-MCNC: 1.9 MG/DL (ref 1.6–2.6)
MCH RBC QN AUTO: 27.8 PG (ref 26–34)
MCHC RBC AUTO-ENTMCNC: 32.1 G/DL (ref 31–37)
MCV RBC AUTO: 86.8 FL
MONOCYTES # BLD AUTO: 1.07 X10(3) UL (ref 0.1–1)
MONOCYTES NFR BLD AUTO: 11.6 %
NEUTROPHILS # BLD AUTO: 6.09 X10 (3) UL (ref 1.5–7.7)
NEUTROPHILS # BLD AUTO: 6.09 X10(3) UL (ref 1.5–7.7)
NEUTROPHILS NFR BLD AUTO: 65.9 %
OSMOLALITY SERPL CALC.SUM OF ELEC: 284 MOSM/KG (ref 275–295)
OSMOLALITY SERPL CALC.SUM OF ELEC: 284 MOSM/KG (ref 275–295)
PLATELET # BLD AUTO: 285 10(3)UL (ref 150–450)
POTASSIUM SERPL-SCNC: 4 MMOL/L (ref 3.5–5.1)
POTASSIUM SERPL-SCNC: 4 MMOL/L (ref 3.5–5.1)
PROT SERPL-MCNC: 7.1 G/DL (ref 5.7–8.2)
RBC # BLD AUTO: 3.95 X10(6)UL
SODIUM SERPL-SCNC: 134 MMOL/L (ref 136–145)
SODIUM SERPL-SCNC: 134 MMOL/L (ref 136–145)
WBC # BLD AUTO: 9.3 X10(3) UL (ref 4–11)

## 2024-07-02 PROCEDURE — 97162 PT EVAL MOD COMPLEX 30 MIN: CPT

## 2024-07-02 PROCEDURE — 85025 COMPLETE CBC W/AUTO DIFF WBC: CPT | Performed by: INTERNAL MEDICINE

## 2024-07-02 PROCEDURE — 97166 OT EVAL MOD COMPLEX 45 MIN: CPT

## 2024-07-02 PROCEDURE — 97530 THERAPEUTIC ACTIVITIES: CPT

## 2024-07-02 PROCEDURE — 80053 COMPREHEN METABOLIC PANEL: CPT | Performed by: INTERNAL MEDICINE

## 2024-07-02 PROCEDURE — 83735 ASSAY OF MAGNESIUM: CPT | Performed by: INTERNAL MEDICINE

## 2024-07-02 RX ORDER — ACETAMINOPHEN 500 MG
500 TABLET ORAL EVERY 4 HOURS PRN
Status: DISCONTINUED | OUTPATIENT
Start: 2024-07-02 | End: 2024-07-06

## 2024-07-02 NOTE — PLAN OF CARE
Problem: Patient Centered Care  Goal: Patient preferences are identified and integrated in the patient's plan of care  Description: Interventions:  - What would you like us to know as we care for you? From home alone with caregiver   - Provide timely, complete, and accurate information to patient/family  - Incorporate patient and family knowledge, values, beliefs, and cultural backgrounds into the planning and delivery of care  - Encourage patient/family to participate in care and decision-making at the level they choose  - Honor patient and family perspectives and choices  Outcome: Progressing     Problem: Patient/Family Goals  Goal: Patient/Family Long Term Goal  Description: Patient's Long Term Goal: to go back home    Interventions:  - follow medical regimen  - See additional Care Plan goals for specific interventions  Outcome: Progressing  Goal: Patient/Family Short Term Goal  Description: Patient's Short Term Goal: To not be SOB    Interventions:   - Follow medical regimen  -IV lasix  -cards consult  - See additional Care Plan goals for specific interventions  Outcome: Progressing     Problem: SAFETY ADULT - FALL  Goal: Free from fall injury  Description: INTERVENTIONS:  - Assess pt frequently for physical needs  - Identify cognitive and physical deficits and behaviors that affect risk of falls.  - [place] fall precautions as indicated by assessment.  - Educate pt/family on patient safety including physical limitations  - Instruct pt to call for assistance with activity based on assessment  - Modify environment to reduce risk of injury  - Provide assistive devices as appropriate  - Consider OT/PT consult to assist with strengthening/mobility  - Encourage toileting schedule  Outcome: Progressing     Problem: RESPIRATORY - ADULT  Goal: Achieves optimal ventilation and oxygenation  Description: INTERVENTIONS:  - Assess for changes in respiratory status  - Assess for changes in mentation and behavior  -  Position to facilitate oxygenation and minimize respiratory effort  - Oxygen supplementation based on oxygen saturation or ABGs  - Provide Smoking Cessation handout, if applicable  - Encourage broncho-pulmonary hygiene including cough, deep breathe, Incentive Spirometry  - Assess the need for suctioning and perform as needed  - Assess and instruct to report SOB or any respiratory difficulty  - Respiratory Therapy support as indicated  - Manage/alleviate anxiety  - Monitor for signs/symptoms of CO2 retention  Outcome: Progressing     Problem: CARDIOVASCULAR - ADULT  Goal: Maintains optimal cardiac output and hemodynamic stability  Description: INTERVENTIONS:  - Monitor vital signs, rhythm, and trends  - Monitor for bleeding, hypotension and signs of decreased cardiac output  - Evaluate effectiveness of vasoactive medications to optimize hemodynamic stability  - Monitor arterial and/or venous puncture sites for bleeding and/or hematoma  - Assess quality of pulses, skin color and temperature  - Assess for signs of decreased coronary artery perfusion - ex. Angina  - Evaluate fluid balance, assess for edema, trend weights  Outcome: Progressing  Goal: Absence of cardiac arrhythmias or at baseline  Description: INTERVENTIONS:  - Continuous cardiac monitoring, monitor vital signs, obtain 12 lead EKG if indicated  - Evaluate effectiveness of antiarrhythmic and heart rate control medications as ordered  - Initiate emergency measures for life threatening arrhythmias  - Monitor electrolytes and administer replacement therapy as ordered  Outcome: Progressing

## 2024-07-02 NOTE — PROGRESS NOTES
[facility]  part of [facility]    Cardiology Progress Note    Sharyn Mayen Patient Status:  Inpatient    1928 MRN [id number]   Location [facility] 3W/SW Attending Sofia Cuenca MD   Hosp Day # 1 PCP Jose E Varghese MD       Impression/Plan:  89 year old female presenting with:    Dyspnea, probably a component of CHF on top of chronic right pleural effusion (exudative in the past)  Acute on chronic diastolic CHF (EF 60% this admission)  Atrial tachycardia (slow atrial flutter)  Small \"penetrating ulcers\" noted descending aoarta on CT chest, asymptomatic, unclear significance  HTN, controlled  HL, on statin  History of CVA  Recent herpes zoster    - Cont IV diuresis with lasix 40mg bid; strict I/Os, monitoring of renal function and electrolytes  - Cont asa, plavix, bb; indication for plavix unclear at this time (patient unaware, possible 2/2 prior CVA seen on MRI brain).  Given atrial flutter on admission consideration should be given to doac in place of dapt for further stroke prevention if consistent with goals of care  - Pleural effusion as per pulm  - Monitor on tele  - Will follow     Subjective: No events overnight.  Today patient complaining primarily of neck pain, denies chest pain, palpitations, dyspnea.    Patient Active Problem List   Diagnosis    Essential hypertension, malignant    Impingement syndrome of shoulder    Elevated cholesterol    Pulmonary HTN (HCC)    Primary localized osteoarthrosis, lower leg    Degenerative disc disease, lumbar    Aortic stenosis, mild    CHF (congestive heart failure), NYHA class I, chronic, diastolic (HCC)    Hair loss    Benign hypertension with CKD (chronic kidney disease) stage III (HCC)    Stress incontinence in female    Ischemic colitis (HCC)    Adnexal cyst    Ocular migraine with status migrainosus    Cough due to ACE inhibitor    Vitamin D deficiency    Bilateral primary osteoarthritis of knee    Recurrent major  depressive disorder, in partial remission (HCC)    DNR (do not resuscitate)    Tachycardia-bradycardia syndrome (HCC)    Acute exacerbation of CHF (congestive heart failure) (HCC)    Shortness of breath    Leg swelling    Hypoxia       Objective:   Temp: 98.8 °F (37.1 °C)  Pulse: 61  Resp: 18  BP: 129/49    Intake/Output:     Intake/Output Summary (Last 24 hours) at 7/2/2024 0925  Last data filed at 7/2/2024 0432  Gross per 24 hour   Intake 1158 ml   Output 2150 ml   Net -992 ml       Last 3 Weights   07/02/24 0555 153 lb 8 oz (69.6 kg)   07/01/24 1030 152 lb 8 oz (69.2 kg)   03/14/22 1521 181 lb (82.1 kg)   09/24/21 1123 177 lb (80.3 kg)       Tele: NSR, PACs    Physical Exam:    General: Alert and oriented x 3. No apparent distress. No respiratory or constitutional distress.  HEENT: Normocephalic, anicteric sclera, neck supple, no thyromegaly or adenopathy.  Neck: No JVD, carotids 2+, no bruits.  Cardiac: RRR, no m/r/g  Lungs: Diminished bilat  Abdomen: Soft, non-tender. No organosplenomegally, mass or rebound, BS-present.  Extremities: Without clubbing, cyanosis or edema.  Peripheral pulses are 2+.  Neurologic: Alert and oriented, normal affect. No motor or coordinational deficit.  Skin: Warm and dry.     Laboratory/Data:    Labs:         Recent Labs   Lab 07/01/24  0354 07/02/24  0633   WBC 9.9 9.3   HGB 13.0 11.0*   MCV 87.0 86.8   .0 285.0       Recent Labs   Lab 07/01/24  0354 07/02/24  0633    134*  134*   K 4.8 4.0  4.0    97*  97*   CO2 27.0 29.0  29.0   BUN 25* 27*  27*   CREATSERUM 1.06* 1.14*  1.14*   CA 9.3 9.1  9.1   MG  --  1.9   * 107*  107*       Recent Labs   Lab 07/02/24  0633   ALT 7*   AST 16   ALB 3.8       No results for input(s): \"TROP\" in the last 168 hours.    Allergies:   Allergies   Allergen Reactions    Aceinhibitors [Ace Inhibitors] Coughing       Medications:  Current Facility-Administered Medications   Medication Dose Route Frequency    acetaminophen  (Tylenol Extra Strength) tab 500 mg  500 mg Oral Q4H PRN    [Held by provider] amLODIPine (Norvasc) tab 5 mg  5 mg Oral BID    clopidogrel (Plavix) tab 75 mg  75 mg Oral Daily    escitalopram (Lexapro) tab 10 mg  10 mg Oral Daily    latanoprost (Xalatan) 0.005 % ophthalmic solution 1 drop  1 drop Both Eyes Nightly    metoprolol succinate ER (Toprol XL) 24 hr tab 50 mg  50 mg Oral Daily    pantoprazole (Protonix) DR tab 20 mg  20 mg Oral QAM AC    heparin (Porcine) 5000 UNIT/ML injection 5,000 Units  5,000 Units Subcutaneous Q8H JOAN    melatonin tab 3 mg  3 mg Oral Nightly PRN    polyethylene glycol (PEG 3350) (Miralax) 17 g oral packet 17 g  17 g Oral Daily PRN    sennosides (Senokot) tab 17.2 mg  17.2 mg Oral Nightly PRN    bisacodyl (Dulcolax) 10 MG rectal suppository 10 mg  10 mg Rectal Daily PRN    fleet enema (Fleet) 7-19 GM/118ML rectal enema 133 mL  1 enema Rectal Once PRN    ondansetron (Zofran) 4 MG/2ML injection 4 mg  4 mg Intravenous Q6H PRN    metoclopramide (Reglan) 5 mg/mL injection 10 mg  10 mg Intravenous Q8H PRN    furosemide (Lasix) 10 mg/mL injection 40 mg  40 mg Intravenous BID (Diuretic)    aspirin chewable tab 81 mg  81 mg Oral Daily    docusate sodium (Colace) cap 100 mg  100 mg Oral BID       Aftab Meek MD  7/2/2024  9:25 AM

## 2024-07-02 NOTE — OCCUPATIONAL THERAPY NOTE
OCCUPATIONAL THERAPY EVALUATION - INPATIENT     Room Number: 304/304-A  Evaluation Date: 7/2/2024  Type of Evaluation: Initial  Presenting Problem: SOB    Physician Order: IP Consult to Occupational Therapy  Reason for Therapy: ADL/IADL Dysfunction and Discharge Planning    OCCUPATIONAL THERAPY ASSESSMENT   Patient is a 89 year old female admitted 7/1/2024 for SOB; acute CHF exacerbation.  Prior to admission, patient's baseline is home with part time caregiver (essentially there all the time, but patient reports she \"works\" outside of the house 3x week); per pt, CG assists as needed with ADLS, but most of the time she is able to manage tasks such as bathing,dressing, toileting at setup/SPV level; she uses RW for fxl mobility; Patient is currently functioning below baseline with toileting, bathing, upper body dressing, lower body dressing, bed mobility, transfers, stating sitting balance, dynamic sitting balance, static standing balance, dynamic standing balance, maintaining seated position, functional standing tolerance, energy conservation strategies, aerobic capacity, and performing household tasks.  Patient is requiring up to Max A  as a result of the following impairments: activity tolerance, endurance, deconditioning, balance, mobility. Occupational Therapy will continue to follow for duration of hospitalization.    Patient will benefit from continued skilled OT Services to promote return to prior level of function and safety with continuous assistance and gradual rehabilitative therapy     PLAN  OT Treatment Plan: Balance activities;Energy conservation/work simplification techniques;ADL training;Functional transfer training;Endurance training;Cognitive reorientation;Patient/Family education;Patient/Family training;Compensatory technique education  OT Device Recommendations: TBD    OCCUPATIONAL THERAPY MEDICAL/SOCIAL HISTORY   Problem List  Principal Problem:    Shortness of breath  Active Problems:    Acute  exacerbation of CHF (congestive heart failure) (HCC)    Leg swelling    Hypoxia    HOME SITUATION  Type of Home: House  Home Layout: One level  Lives With: Caregiver part-time  Toilet and Equipment: Comfort height toilet  Shower/Tub and Equipment: Shower chair; Tub-shower combo  Other Equipment: -- (RW)    SUBJECTIVE  I could try to get up to the chair. I need to take it slow.     OCCUPATIONAL THERAPY EXAMINATION    OBJECTIVE  Precautions: Bed/chair alarm  Fall Risk: High fall risk    PAIN ASSESSMENT  Rating: -- (Not quantified)  Location: neck  Management Techniques: Nurse notified    ACTIVITY TOLERANCE  Pulse: 90        BP: 129/83  BP Location: Right arm  BP Method: Automatic  Patient Position: Sitting    O2 SATURATIONS  Oxygen Therapy  SPO2% on Oxygen at Rest: 86  At rest oxygen flow (liters per minute): 4  SPO2% Ambulation on Oxygen: 84 (RN ok'd increasing to 6L)  Ambulation oxygen flow (liters per minute): 6    COGNITION  Alert and oriented; able to follow all simple commands; reliable historian; good insight to deficits    Communication: WNL    Behavioral/Emotional/Social: Cooperative, pleasant     RANGE OF MOTION   Upper extremity ROM is within functional limits     STRENGTH ASSESSMENT  Upper extremity strength is within functional limits     COORDINATION  Gross Motor: WFL   Fine Motor: WFL     ACTIVITIES OF DAILY LIVING ASSESSMENT  AM-PAC ‘6-Clicks’ Inpatient Daily Activity Short Form  How much help from another person does the patient currently need…  -   Putting on and taking off regular lower body clothing?: A Lot  -   Bathing (including washing, rinsing, drying)?: A Lot  -   Toileting, which includes using toilet, bedpan or urinal? : A Lot  -   Putting on and taking off regular upper body clothing?: A Little  -   Taking care of personal grooming such as brushing teeth?: A Little  -   Eating meals?: A Little    AM-PAC Score:  Score: 15  Approx Degree of Impairment: 56.46%  Standardized Score (AM-PAC  Scale): 34.69  CMS Modifier (G-Code): CK    FUNCTIONAL TRANSFER ASSESSMENT  Comments: Mod A x2 SPT to bedside chair; pt declined using RW for t/f      BED MOBILITY   Comments: Mod A x2 for supine to EOB   BALANCE ASSESSMENT   Comments: Patient tolerating >8 minutes at side of bed while therapy monitored O2; pt at rest <90% on 3L; with RN permission, increased flow to 6L for improved activity tolearnce; pt educated on recovery breathing; O2 maintained between 88-90% with recovery. After t/f to chair, left pt on 6L; RN aware.     FUNCTIONAL ADL ASSESSMENT   Comments: Pt is completing self care at bed and chair level at this time; is not tolerating ambulation up to bathroom; pt is able to self feed and complete simple groom when setup; she requires assist for all mobility related self care 2/2 aforementioned deficits.     EDUCATION PROVIDED  Patient: Role of Occupational Therapy; Plan of Care; Discharge Recommendations; Functional Transfer Techniques; Fall Prevention; Surgical Precautions; Posture/Positioning; Compensatory ADL Techniques  Patient's Response to Education: Verbalized Understanding    The patient's Approx Degree of Impairment: 56.46% has been calculated based on documentation in the [facility] '6 clicks' Inpatient Daily Activity Short Form.  Research supports that patients with this level of impairment may benefit from GR.  Final disposition will be made by interdisciplinary medical team.     Patient End of Session: Up in chair;Needs met;Call light within reach;RN aware of session/findings;All patient questions and concerns addressed    OT Goals  Patients self stated goal is: no goals stated      Patient will complete functional transfer with SBA   Comment:     Patient will complete toileting with SBA   Comment:     Patient will tolerate standing for 3-5 minutes in prep for adls with SBA   Comment:     Comment:          Goals  on:    Frequency: 3-5x week    Patient Evaluation Complexity Level:    Occupational Profile/Medical History MODERATE - Expanded review of history including review of medical or therapy record   Specific performance deficits impacting engagement in ADL/IADL MODERATE  3 - 5 performance deficits   Client Assessment/Performance Deficits MODERATE - Comorbidities and min to mod modifications of tasks    Clinical Decision Making MODERATE - Analysis of occupational profile, detailed assessments, several treatment options    Overall Complexity MODERATE     OT Session Time: 23 minutes  Self-Care Home Management: 0 minutes  Therapeutic Activity: 23 minutes    Guille Cabrera, Occupational Therapist, OTR/L ext [phone]

## 2024-07-02 NOTE — PHYSICAL THERAPY NOTE
PHYSICAL THERAPY EVALUATION - INPATIENT     Room Number: 304/304-A  Evaluation Date: 7/2/2024  Type of Evaluation: Initial   Physician Order: PT Eval and Treat    Presenting Problem: shortness of breath, LE swelling     Reason for Therapy: Mobility Dysfunction and Discharge Planning    PHYSICAL THERAPY ASSESSMENT   Patient is a 89 year old female admitted 7/1/2024 for shortness of breath, LE swelling. Prior to admission, patient's baseline is ambulating with use of rolling walker, Mod I for ADLs with set-up assist from care-giver. Patient reports having a live-in caregiver that works outside of the house 2-3 days/week. Patient is currently functioning below baseline with bed mobility, transfers, gait, maintaining seated position, standing prolonged periods, and performing household tasks.  Patient is requiring moderate assist and maximum assist as a result of the following impairments: decreased functional strength, decreased endurance/aerobic capacity, pain, impaired sitting and standing balance, decreased muscular endurance, medical status, and increased O2 needs from baseline.  Physical Therapy will continue to follow for duration of hospitalization.    Patient will benefit from continued skilled PT Services to promote return to prior level of function and safety with continuous assistance and gradual rehabilitative therapy .    PLAN  PT Treatment Plan: Bed mobility;Body mechanics;Coordination;Endurance;Energy conservation;Patient education;Gait training;Strengthening;Transfer training;Balance training  Rehab Potential : Good  Frequency (Obs): 3-5x/week    PHYSICAL THERAPY MEDICAL/SOCIAL HISTORY     Problem List  Principal Problem:    Shortness of breath  Active Problems:    Acute exacerbation of CHF (congestive heart failure) (HCC)    Leg swelling    Hypoxia      HOME SITUATION  Home Situation  Type of Home: House  Home Layout: One level  Lives With: Caregiver part-time  Drives: No  Patient Owned Equipment:  Rolling walker     SUBJECTIVE  \"My neck hurts\"    PHYSICAL THERAPY EXAMINATION   OBJECTIVE  Precautions: Bed/chair alarm  Fall Risk: High fall risk    WEIGHT BEARING RESTRICTION  Weight Bearing Restriction: None    PAIN ASSESSMENT  Rating: Unable to rate  Location: posterior neck  Management Techniques: Activity promotion;Body mechanics;Repositioning    COGNITION  Following Commands:  follows one step commands with increased time and follows one step commands with repetition    RANGE OF MOTION AND STRENGTH ASSESSMENT  Upper extremity ROM and strength are within functional limits   Lower extremity ROM is within functional limits   Lower extremity strength is within functional limits     BALANCE  Static Sitting: Fair -  Dynamic Sitting: Poor +  Static Standing: Poor  Dynamic Standing: Poor -    O2 WALK  Oxygen Therapy  SPO2% on Oxygen at Rest: 86  At rest oxygen flow (liters per minute): 4  SPO2% Ambulation on Oxygen: 83 (RN called and okay to increase to 6 L/min - increased SpO2 to 88%, RN aware)  Ambulation oxygen flow (liters per minute): 4    AM-PAC '6-Clicks' INPATIENT SHORT FORM - BASIC MOBILITY  How much difficulty does the patient currently have...  Patient Difficulty: Turning over in bed (including adjusting bedclothes, sheets and blankets)?: A Lot   Patient Difficulty: Sitting down on and standing up from a chair with arms (e.g., wheelchair, bedside commode, etc.): A Lot   Patient Difficulty: Moving from lying on back to sitting on the side of the bed?: A Lot   How much help from another person does the patient currently need...   Help from Another: Moving to and from a bed to a chair (including a wheelchair)?: A Lot   Help from Another: Need to walk in hospital room?: A Lot   Help from Another: Climbing 3-5 steps with a railing?: A Lot     AM-PAC Score:  Raw Score: 12   Approx Degree of Impairment: 68.66%   Standardized Score (AM-PAC Scale): 35.33   CMS Modifier (G-Code): CL    FUNCTIONAL ABILITY  STATUS  Functional Mobility/Gait Assessment  Gait Assistance: Not tested  Supine to Sit: moderate assist x 2. Patient tolerated static sitting EOB with BUE Support and Min A/CGA for approx 10 minutes  SPT bed>bedside chair: moderate assist x 2 with gait belt    Exercise/Education Provided:  Bed mobility  Body mechanics  Energy conservation  Functional activity tolerated  Gait training  Posture  Transfer training    The patient's Approx Degree of Impairment: 68.66% has been calculated based on documentation in the [facility] '6 clicks' Inpatient Basic Mobility Short Form.  Research supports that patients with this level of impairment may benefit from rehab.  Final disposition will be made by interdisciplinary medical team.    Patient in bed upon arrival. RN approved activity. Educated patient on POC and benefits of mobilization. Agreeable to participate. Patient reporting neck pain, not quantified per the pain scale.  Patient End of Session: Up in chair;Needs met;Call light within reach;RN aware of session/findings;All patient questions and concerns addressed (alarm present in chair - no battery for alarm box)    CURRENT GOALS  Goals to be met by: 7/16/24  Patient Goal Patient's self-stated goal is: none stated   Goal #1 Patient is able to demonstrate supine - sit EOB @ level: minimum assistance     Goal #1   Current Status    Goal #2 Patient is able to demonstrate transfers Sit to/from Stand at assistance level: minimum assistance with walker - rolling     Goal #2  Current Status    Goal #3 Patient is able to ambulate 30 feet with assist device: walker - rolling at assistance level: minimum assistance   Goal #3   Current Status    Goal #4 Patient to demonstrate independence with home activity/exercise instructions provided to patient in preparation for discharge.   Goal #4   Current Status      Patient Evaluation Complexity Level:  History Moderate - 1 or 2 personal factors and/or co-morbidities   Examination of body  systems Moderate - addressing a total of 3 or more elements   Clinical Presentation  Moderate - Evolving   Clinical Decision Making  Moderate Complexity     Therapeutic Activity:  20 minutes

## 2024-07-02 NOTE — DISCHARGE INSTRUCTIONS
Going Home Instructions  In this section you will find the tools which will guide you through the first few days after you leave the hospital. Continued use of these tools will help you develop the skills necessary to keep your heart failure under control.     Home Care Instructions Following Heart Failure - the most important things to do every day include:   Weigh yourself and review the “Self-Check Plan” sheet every morning.   Call your cardiologist office if you are in the “Pay Attention-Use Caution” (yellow zone) or “Medical Alert-Warning!” (red zone) as outlined in the Self-Check Plan sheet.  Take your medicines as prescribed.  Limit your sodium (salt) intake.  Know when to call your cardiologist, primary doctor, or nurse.  Know when to seek emergency care.      Things for You to Remember:   1. See your doctor or healthcare provider as written on your discharge instructions.  It is important that you attend this appointment to make sure your symptoms are under control.     2. Your recommended sodium intake is [id number] mg daily.    3.  Weigh yourself every day.    4. Some exercise and activity is important to help keep your heart functioning and strong. Unless instructed not to exercise, you may walk at a slow to moderate pace for 10-15 minutes 2-3 days per week to start. Pace your activity to prevent shortness of breath or fatigue. Stop exercising if you develop chest pain, lightheadedness, or significant shortness of breath.       Call Your Cardiologist If:   You gain 2-3 pounds in one day or 5 pounds in one week.  You have more difficulty breathing.  You are getting more tired with normal activity.  You are more short of breath lying down, or awaken at night short of breath.  You have swelling of your feet or legs.  You urinate less often during the day and more often at night.  You have cramps in your legs.  You have blurred vision or see yellowish-green halos around objects of lights.    Go to the  Emergency Room If:   You have pain or tightness in your chest  You are extremely short of breath  You are coughing up pink-frothy mucus  You are traveling and develop symptoms of worsening heart failure      ** Please follow up with your cardiologist or Advanced Practice Provider as written on your discharge instructions. If you are not provided with an appointment, let your nurse know so you can get an appointment**     [facility]:  [facility]  [address] [place] [address]  Phone: [phone]  Fax: [phone]

## 2024-07-02 NOTE — PROGRESS NOTES
Pulmonary Progress Note     Assessment / Plan:  Right pleural effusion - s/p thora in the past notable for exudative fluid with negative cytology. Further workup previously deferred due to lack of symptoms, co-morbidities and age  - pleural effusion is stable when compared to prior imaging at [facility]. No further intervention/workup  HFpEF  - diuresis  Chronic cough  - BD protocol  Dispo  - PT eval  - desat screen    Called her daughter, Tammie, with an updated    Subjective:  Did not sleep well. Generally uncomfortable. No dyspnea.     Objective:  Vitals:    07/01/24 1900 07/01/24 2043 07/02/24 0354 07/02/24 0555   BP:  109/52 123/58    BP Location:  Right arm Right arm    Pulse: 77 76 77    Resp:  18 20    Temp:  98 °F (36.7 °C) 98.8 °F (37.1 °C)    TempSrc:  Oral Oral    SpO2:  94% 92%    Weight:    153 lb 8 oz (69.6 kg)   Height:         Physical Exam:  General: no apparent distress, conversant  Skin: no rash, ulcers or subcutaneous nodules  Eyes: anicteric sclerae, moist conjunctivae  Head, ears, nose, throat: atraumatic, oropharynx clear with moist mucous membranes  Neck: trachea midline with no thyromegaly  Heart: regular rate and rhythm, no murmurs / rubs / gallops  Lungs: decreased at the bases R>L  Extremities: no edema or cyanosis  Psych: interactive, answering questions appropriately, appropriate affect    Medications:  Reviewed in EMR    Lab Data:  Reviewed in EMR    Imaging:  I independently visualized all relevant chest imaging in PACS and agree with radiology interpretation except where noted.

## 2024-07-02 NOTE — CM/SW NOTE
07/02/24 0900   CM/SW Referral Data   Referral Source Social Work (self-referral)   Reason for Referral Discharge planning   Informant Patient   Medical Hx   Does patient have an established PCP? Yes  (Jose E Varghese)   Patient Info   Patient's Current Mental Status at Time of Assessment Alert;Oriented   Patient's Home Environment House   Number of Levels in Home 1   Number of Stair in Home 3 to enter  (dtr or CG assist)   Patient lives with Other  (CG)   Patient Status Prior to Admission   Independent with ADLs and Mobility No   Pt. requires assistance with Housework;Driving;Ambulating;Meals   Services in place prior to admission DME/Supplies at home;[facility]   Type of DME/Supplies Standard Walker;Wheelchair;Shower Chair;Grab Bars   [facility] hours per day 24   [facility] Care days per week 7   Discharge Needs   Anticipated D/C needs Home health care;To be determined;Medical equipment  (watch O2 needs)   Choice of Post-Acute Provider   Informed patient of right to choose their preferred provider Yes   List of appropriate post-acute services provided to patient/family with quality data   (HH ref in [facility] - needs f/up)       09:50AM  SW self referred pt for DC Planning.    SW met w/ pt at bedside. Above assessment completed.    Pt confirmed living \"with a woman who helps me.\" Pt confirmed her home is 1 level w/ approx 3 steps to enter. Per pt, she always has assistance w/ the steps.    Pt uses a walker for ambulation and has  WC for longer distances outside the home. Per pt, her live in CG/woman works outside the home 2-3 days/week. Per pt, if she really needed her one of  those days, the CG would stay home w/ her. Pt also confirmed her dtr assists when needed as well.    Pt denies hx w/ HH or ASA. Pt might be open to HH at DC if needs are indicated.    Pt is currently on 4L O2 w/ no home oxygen. SW to monitor for needs closer to DC.    Tentative HH referrals sent in [facility] for review. F2F  entered/sent.    02:00PM  Per chart, Anticipated therapy need: Gradual Rehabilitative Therapy    ASA referral sent in [facility]. PASRR completed and queued for review due to pt's Lexapro medication. Will need to upload to referral when resulted. Request sent to [facility] team for review.    SW to f/up w/ pt tomorrow when ASA list is generated for review.    03:00PM  PASRR level 1 completed and no level 2 required. Uploaded to [facility].    PLAN: ASA vs Home w/ CG and poss HH - pending list/choice, f/up w/ pt & med clear         SW/CM to remain available for support and/or discharge planning.         Torie Alex, MSW, LSW [id number]

## 2024-07-03 LAB
ANION GAP SERPL CALC-SCNC: 9 MMOL/L (ref 0–18)
BASOPHILS # BLD AUTO: 0.03 X10(3) UL (ref 0–0.2)
BASOPHILS NFR BLD AUTO: 0.3 %
BUN BLD-MCNC: 31 MG/DL (ref 9–23)
BUN/CREAT SERPL: 28.4 (ref 10–20)
CALCIUM BLD-MCNC: 9 MG/DL (ref 8.7–10.4)
CHLORIDE SERPL-SCNC: 97 MMOL/L (ref 98–112)
CO2 SERPL-SCNC: 28 MMOL/L (ref 21–32)
CREAT BLD-MCNC: 1.09 MG/DL
DEPRECATED RDW RBC AUTO: 49.2 FL (ref 35.1–46.3)
EGFRCR SERPLBLD CKD-EPI 2021: 46 ML/MIN/1.73M2 (ref 60–?)
EOSINOPHIL # BLD AUTO: 0.21 X10(3) UL (ref 0–0.7)
EOSINOPHIL NFR BLD AUTO: 2.3 %
ERYTHROCYTE [DISTWIDTH] IN BLOOD BY AUTOMATED COUNT: 15.5 % (ref 11–15)
GLUCOSE BLD-MCNC: 106 MG/DL (ref 70–99)
HCT VFR BLD AUTO: 32.5 %
HGB BLD-MCNC: 10.7 G/DL
IMM GRANULOCYTES # BLD AUTO: 0.04 X10(3) UL (ref 0–1)
IMM GRANULOCYTES NFR BLD: 0.4 %
LYMPHOCYTES # BLD AUTO: 0.99 X10(3) UL (ref 1–4)
LYMPHOCYTES NFR BLD AUTO: 11 %
MAGNESIUM SERPL-MCNC: 2 MG/DL (ref 1.6–2.6)
MCH RBC QN AUTO: 28.7 PG (ref 26–34)
MCHC RBC AUTO-ENTMCNC: 32.9 G/DL (ref 31–37)
MCV RBC AUTO: 87.1 FL
MONOCYTES # BLD AUTO: 0.81 X10(3) UL (ref 0.1–1)
MONOCYTES NFR BLD AUTO: 9 %
NEUTROPHILS # BLD AUTO: 6.88 X10 (3) UL (ref 1.5–7.7)
NEUTROPHILS # BLD AUTO: 6.88 X10(3) UL (ref 1.5–7.7)
NEUTROPHILS NFR BLD AUTO: 77 %
OSMOLALITY SERPL CALC.SUM OF ELEC: 285 MOSM/KG (ref 275–295)
PLATELET # BLD AUTO: 252 10(3)UL (ref 150–450)
POTASSIUM SERPL-SCNC: 3.8 MMOL/L (ref 3.5–5.1)
RBC # BLD AUTO: 3.73 X10(6)UL
SODIUM SERPL-SCNC: 134 MMOL/L (ref 136–145)
WBC # BLD AUTO: 9 X10(3) UL (ref 4–11)

## 2024-07-03 PROCEDURE — 80048 BASIC METABOLIC PNL TOTAL CA: CPT | Performed by: INTERNAL MEDICINE

## 2024-07-03 PROCEDURE — 85025 COMPLETE CBC W/AUTO DIFF WBC: CPT | Performed by: INTERNAL MEDICINE

## 2024-07-03 PROCEDURE — 83735 ASSAY OF MAGNESIUM: CPT | Performed by: INTERNAL MEDICINE

## 2024-07-03 RX ORDER — POTASSIUM CHLORIDE 20 MEQ/1
40 TABLET, EXTENDED RELEASE ORAL ONCE
Status: COMPLETED | OUTPATIENT
Start: 2024-07-03 | End: 2024-07-03

## 2024-07-03 RX ORDER — MAGNESIUM HYDROXIDE/ALUMINUM HYDROXICE/SIMETHICONE 120; 1200; 1200 MG/30ML; MG/30ML; MG/30ML
30 SUSPENSION ORAL 4 TIMES DAILY PRN
Status: DISCONTINUED | OUTPATIENT
Start: 2024-07-03 | End: 2024-07-06

## 2024-07-03 RX ORDER — MUSCLE RUB CREAM 100; 150 MG/G; MG/G
CREAM TOPICAL 3 TIMES DAILY PRN
Status: DISCONTINUED | OUTPATIENT
Start: 2024-07-03 | End: 2024-07-06

## 2024-07-03 RX ORDER — HYDROCODONE BITARTRATE AND ACETAMINOPHEN 5; 325 MG/1; MG/1
1 TABLET ORAL ONCE
Status: COMPLETED | OUTPATIENT
Start: 2024-07-03 | End: 2024-07-03

## 2024-07-03 NOTE — PROGRESS NOTES
Residential Palliative Liaison received palliative referral for community PC services. Waiting to obtain insurance (verification/authorization) prior to pursuing.      Marga Kohler  Residential Liaison  [phone]

## 2024-07-03 NOTE — PROGRESS NOTES
Pulmonary Progress Note     Assessment / Plan:  Right pleural effusion - s/p thora in the past notable for exudative fluid with negative cytology. Further workup previously deferred due to lack of symptoms, co-morbidities and age  - pleural effusion is stable when compared to prior imaging at [place]. No further   intervention/workup  - Lung is likely trapped and effusion is chronic. Pt declines thoracentsis due to previous pain.  I would not recommend fluid removal at this point.  Hypoxic respiratory failure - d/t CHF, hypoventilation and atelectasis from effusion  - Desat screen pending  - Wean oxygen as tolerated; presently 4L  HFpEF  - diuresis  - further per cardiology & hospitalist  Chronic cough  - BD protocol  Dispo  - PT eval  - desat screen  - Will follow again Friday      Subjective:  Pt feels short of breaht but no different than prior    Objective:  Vitals:    07/02/24 1349 07/02/24 1749 07/02/24 2131 07/03/24 0522   BP:  120/54 139/59 137/79   BP Location:  Left arm Left arm Right arm   Pulse: 55 56 64 65   Resp:  18 18 18   Temp:   97.5 °F (36.4 °C) 97.9 °F (36.6 °C)   TempSrc:   Oral Oral   SpO2: 96% 91% 92% 92%   Weight:       Height:         Physical Exam:  General: no apparent distress, conversant  Skin: no rash, ulcers or subcutaneous nodules  Eyes: anicteric sclerae, moist conjunctivae  Head, ears, nose, throat: atraumatic, oropharynx clear with moist mucous membranes  Neck: trachea midline with no thyromegaly  Heart: regular rate and rhythm, no murmurs / rubs / gallops  Lungs: clear bilaterally, normal respiratory effort, no accessory muscle use  Extremities: no edema or cyanosis  Psych: interactive, answering questions appropriately, appropriate affect    Medications:  Reviewed in EMR    Lab Data:  Reviewed in EMR    Imaging:  I independently visualized all relevant chest imaging in PACS and agree with radiology interpretation except where noted.

## 2024-07-03 NOTE — PLAN OF CARE
Problem: Patient Centered Care  Goal: Patient preferences are identified and integrated in the patient's plan of care  Description: Interventions:  - What would you like us to know as we care for you? I'm from home alone with caregiver   - Provide timely, complete, and accurate information to patient/family  - Incorporate patient and family knowledge, values, beliefs, and cultural backgrounds into the planning and delivery of care  - Encourage patient/family to participate in care and decision-making at the level they choose  - Honor patient and family perspectives and choices  Outcome: Progressing     Problem: Patient/Family Goals  Goal: Patient/Family Long Term Goal  Description: Patient's Long Term Goal: to go back home    Interventions:  - monitor vitals, labs, imaging  - diurese  - oxygen therapy  - follow medical regimen  - See additional Care Plan goals for specific interventions  Outcome: Progressing  Goal: Patient/Family Short Term Goal  Description: Patient's Short Term Goal: To not be SOB    Interventions:   - Follow medical regimen  -IV lasix  -cards consult  - See additional Care Plan goals for specific interventions  Outcome: Progressing     Problem: SAFETY ADULT - FALL  Goal: Free from fall injury  Description: INTERVENTIONS:  - Assess pt frequently for physical needs  - Identify cognitive and physical deficits and behaviors that affect risk of falls.  - [place] fall precautions as indicated by assessment.  - Educate pt/family on patient safety including physical limitations  - Instruct pt to call for assistance with activity based on assessment  - Modify environment to reduce risk of injury  - Provide assistive devices as appropriate  - Consider OT/PT consult to assist with strengthening/mobility  - Encourage toileting schedule  Outcome: Progressing     Problem: RESPIRATORY - ADULT  Goal: Achieves optimal ventilation and oxygenation  Description: INTERVENTIONS:  - Assess for changes in  respiratory status  - Assess for changes in mentation and behavior  - Position to facilitate oxygenation and minimize respiratory effort  - Oxygen supplementation based on oxygen saturation or ABGs  - Provide Smoking Cessation handout, if applicable  - Encourage broncho-pulmonary hygiene including cough, deep breathe, Incentive Spirometry  - Assess the need for suctioning and perform as needed  - Assess and instruct to report SOB or any respiratory difficulty  - Respiratory Therapy support as indicated  - Manage/alleviate anxiety  - Monitor for signs/symptoms of CO2 retention  Outcome: Progressing     Problem: CARDIOVASCULAR - ADULT  Goal: Maintains optimal cardiac output and hemodynamic stability  Description: INTERVENTIONS:  - Monitor vital signs, rhythm, and trends  - Monitor for bleeding, hypotension and signs of decreased cardiac output  - Evaluate effectiveness of vasoactive medications to optimize hemodynamic stability  - Monitor arterial and/or venous puncture sites for bleeding and/or hematoma  - Assess quality of pulses, skin color and temperature  - Assess for signs of decreased coronary artery perfusion - ex. Angina  - Evaluate fluid balance, assess for edema, trend weights  Outcome: Progressing  Goal: Absence of cardiac arrhythmias or at baseline  Description: INTERVENTIONS:  - Continuous cardiac monitoring, monitor vital signs, obtain 12 lead EKG if indicated  - Evaluate effectiveness of antiarrhythmic and heart rate control medications as ordered  - Initiate emergency measures for life threatening arrhythmias  - Monitor electrolytes and administer replacement therapy as ordered  Outcome: Progressing     Problem: HEMATOLOGIC - ADULT  Goal: Maintains hematologic stability  Description: INTERVENTIONS  - Assess for signs and symptoms of bleeding or hemorrhage  - Monitor labs and vital signs for trends  - Administer supportive blood products/factors, fluids and medications as ordered and appropriate  -  Administer supportive blood products/factors as ordered and appropriate  Outcome: Progressing  Goal: Free from bleeding injury  Description: (Example usage: patient with low platelets)  INTERVENTIONS:  - Avoid intramuscular injections, enemas and rectal medication administration  - Ensure safe mobilization of patient  - Hold pressure on venipuncture sites to achieve adequate hemostasis  - Assess for signs and symptoms of internal bleeding  - Monitor lab trends  - Patient is to report abnormal signs of bleeding to staff  - Avoid use of toothpicks and dental floss  - Use electric shaver for shaving  - Use soft bristle tooth brush  - Limit straining and forceful nose blowing  Outcome: Progressing    Up in chair for meals. Prn muscle cream given and heat packs for back pain/neck pain. No complaints of shortness of breath.

## 2024-07-03 NOTE — PLAN OF CARE
Patient is alert and oriented. Patient was up to chair moderate assist with a walker. Family was at the bedside. Patient is on 4L O2 via [place]. PRN tylenol and hot packs were given for pain management. PRN miralax was given for constipation. VTE prophylaxis and safety measures are in place.    Problem: Patient Centered Care  Goal: Patient preferences are identified and integrated in the patient's plan of care  Description: Interventions:  - What would you like us to know as we care for you? I'm from home alone with caregiver   - Provide timely, complete, and accurate information to patient/family  - Incorporate patient and family knowledge, values, beliefs, and cultural backgrounds into the planning and delivery of care  - Encourage patient/family to participate in care and decision-making at the level they choose  - Honor patient and family perspectives and choices  Outcome: Progressing     Problem: Patient/Family Goals  Goal: Patient/Family Long Term Goal  Description: Patient's Long Term Goal: to go back home    Interventions:  - monitor vitals, labs, imaging  - diurese  - oxygen therapy  - follow medical regimen  - See additional Care Plan goals for specific interventions  Outcome: Progressing  Goal: Patient/Family Short Term Goal  Description: Patient's Short Term Goal: To not be SOB    Interventions:   - Follow medical regimen  -IV lasix  -cards consult  - See additional Care Plan goals for specific interventions  Outcome: Progressing     Problem: SAFETY ADULT - FALL  Goal: Free from fall injury  Description: INTERVENTIONS:  - Assess pt frequently for physical needs  - Identify cognitive and physical deficits and behaviors that affect risk of falls.  - [place] fall precautions as indicated by assessment.  - Educate pt/family on patient safety including physical limitations  - Instruct pt to call for assistance with activity based on assessment  - Modify environment to reduce risk of injury  - Provide assistive  devices as appropriate  - Consider OT/PT consult to assist with strengthening/mobility  - Encourage toileting schedule  Outcome: Progressing     Problem: RESPIRATORY - ADULT  Goal: Achieves optimal ventilation and oxygenation  Description: INTERVENTIONS:  - Assess for changes in respiratory status  - Assess for changes in mentation and behavior  - Position to facilitate oxygenation and minimize respiratory effort  - Oxygen supplementation based on oxygen saturation or ABGs  - Provide Smoking Cessation handout, if applicable  - Encourage broncho-pulmonary hygiene including cough, deep breathe, Incentive Spirometry  - Assess the need for suctioning and perform as needed  - Assess and instruct to report SOB or any respiratory difficulty  - Respiratory Therapy support as indicated  - Manage/alleviate anxiety  - Monitor for signs/symptoms of CO2 retention  Outcome: Progressing     Problem: CARDIOVASCULAR - ADULT  Goal: Maintains optimal cardiac output and hemodynamic stability  Description: INTERVENTIONS:  - Monitor vital signs, rhythm, and trends  - Monitor for bleeding, hypotension and signs of decreased cardiac output  - Evaluate effectiveness of vasoactive medications to optimize hemodynamic stability  - Monitor arterial and/or venous puncture sites for bleeding and/or hematoma  - Assess quality of pulses, skin color and temperature  - Assess for signs of decreased coronary artery perfusion - ex. Angina  - Evaluate fluid balance, assess for edema, trend weights  Outcome: Progressing  Goal: Absence of cardiac arrhythmias or at baseline  Description: INTERVENTIONS:  - Continuous cardiac monitoring, monitor vital signs, obtain 12 lead EKG if indicated  - Evaluate effectiveness of antiarrhythmic and heart rate control medications as ordered  - Initiate emergency measures for life threatening arrhythmias  - Monitor electrolytes and administer replacement therapy as ordered  Outcome: Progressing     Problem: HEMATOLOGIC -  ADULT  Goal: Maintains hematologic stability  Description: INTERVENTIONS  - Assess for signs and symptoms of bleeding or hemorrhage  - Monitor labs and vital signs for trends  - Administer supportive blood products/factors, fluids and medications as ordered and appropriate  - Administer supportive blood products/factors as ordered and appropriate  Outcome: Progressing  Goal: Free from bleeding injury  Description: (Example usage: patient with low platelets)  INTERVENTIONS:  - Avoid intramuscular injections, enemas and rectal medication administration  - Ensure safe mobilization of patient  - Hold pressure on venipuncture sites to achieve adequate hemostasis  - Assess for signs and symptoms of internal bleeding  - Monitor lab trends  - Patient is to report abnormal signs of bleeding to staff  - Avoid use of toothpicks and dental floss  - Use electric shaver for shaving  - Use soft bristle tooth brush  - Limit straining and forceful nose blowing  Outcome: Progressing

## 2024-07-03 NOTE — PLAN OF CARE
Problem: Patient Centered Care  Goal: Patient preferences are identified and integrated in the patient's plan of care  Description: Interventions:  - What would you like us to know as we care for you? I'm from home alone with caregiver   - Provide timely, complete, and accurate information to patient/family  - Incorporate patient and family knowledge, values, beliefs, and cultural backgrounds into the planning and delivery of care  - Encourage patient/family to participate in care and decision-making at the level they choose  - Honor patient and family perspectives and choices  Outcome: Progressing     Problem: Patient/Family Goals  Goal: Patient/Family Long Term Goal  Description: Patient's Long Term Goal: to go back home    Interventions:  - monitor vitals, labs, imaging  - diurese  - oxygen therapy  - follow medical regimen  - See additional Care Plan goals for specific interventions  Outcome: Progressing  Goal: Patient/Family Short Term Goal  Description: Patient's Short Term Goal: To not be SOB    Interventions:   - Follow medical regimen  -IV lasix  -cards consult  - See additional Care Plan goals for specific interventions  Outcome: Progressing     Problem: SAFETY ADULT - FALL  Goal: Free from fall injury  Description: INTERVENTIONS:  - Assess pt frequently for physical needs  - Identify cognitive and physical deficits and behaviors that affect risk of falls.  - [place] fall precautions as indicated by assessment.  - Educate pt/family on patient safety including physical limitations  - Instruct pt to call for assistance with activity based on assessment  - Modify environment to reduce risk of injury  - Provide assistive devices as appropriate  - Consider OT/PT consult to assist with strengthening/mobility  - Encourage toileting schedule  Outcome: Progressing     Problem: RESPIRATORY - ADULT  Goal: Achieves optimal ventilation and oxygenation  Description: INTERVENTIONS:  - Assess for changes in  respiratory status  - Assess for changes in mentation and behavior  - Position to facilitate oxygenation and minimize respiratory effort  - Oxygen supplementation based on oxygen saturation or ABGs  - Provide Smoking Cessation handout, if applicable  - Encourage broncho-pulmonary hygiene including cough, deep breathe, Incentive Spirometry  - Assess the need for suctioning and perform as needed  - Assess and instruct to report SOB or any respiratory difficulty  - Respiratory Therapy support as indicated  - Manage/alleviate anxiety  - Monitor for signs/symptoms of CO2 retention  Outcome: Progressing     Problem: CARDIOVASCULAR - ADULT  Goal: Maintains optimal cardiac output and hemodynamic stability  Description: INTERVENTIONS:  - Monitor vital signs, rhythm, and trends  - Monitor for bleeding, hypotension and signs of decreased cardiac output  - Evaluate effectiveness of vasoactive medications to optimize hemodynamic stability  - Monitor arterial and/or venous puncture sites for bleeding and/or hematoma  - Assess quality of pulses, skin color and temperature  - Assess for signs of decreased coronary artery perfusion - ex. Angina  - Evaluate fluid balance, assess for edema, trend weights  Outcome: Progressing  Goal: Absence of cardiac arrhythmias or at baseline  Description: INTERVENTIONS:  - Continuous cardiac monitoring, monitor vital signs, obtain 12 lead EKG if indicated  - Evaluate effectiveness of antiarrhythmic and heart rate control medications as ordered  - Initiate emergency measures for life threatening arrhythmias  - Monitor electrolytes and administer replacement therapy as ordered  Outcome: Progressing     Problem: HEMATOLOGIC - ADULT  Goal: Maintains hematologic stability  Description: INTERVENTIONS  - Assess for signs and symptoms of bleeding or hemorrhage  - Monitor labs and vital signs for trends  - Administer supportive blood products/factors, fluids and medications as ordered and appropriate  -  Administer supportive blood products/factors as ordered and appropriate  Outcome: Progressing  Goal: Free from bleeding injury  Description: (Example usage: patient with low platelets)  INTERVENTIONS:  - Avoid intramuscular injections, enemas and rectal medication administration  - Ensure safe mobilization of patient  - Hold pressure on venipuncture sites to achieve adequate hemostasis  - Assess for signs and symptoms of internal bleeding  - Monitor lab trends  - Patient is to report abnormal signs of bleeding to staff  - Avoid use of toothpicks and dental floss  - Use electric shaver for shaving  - Use soft bristle tooth brush  - Limit straining and forceful nose blowing  Outcome: Progressing

## 2024-07-03 NOTE — PROGRESS NOTES
DM Hospitalist Progress Note     CC: Hospital Follow up    PCP: Jose E Varghese MD       Assessment/Plan:     Principal Problem:    Shortness of breath  Active Problems:    Acute exacerbation of CHF (congestive heart failure) (HCC)    Leg swelling    Hypoxia    Ms. Mayen is a 89 yo F with PMH of HTN, HL, chronic diastolic CHF, recurrent R pleural effusion, CKD3 who presented with shortness of breath.      Acute hypoxic respiratory failure  Acute on chronic diastolic CHF  Recurrent R pleural effusion  - continue IV lasix 40 mg BID, monitor renal function  - cardiology consulted  - TTE with EF 60%, unable to assess diastolic dysfunction, elevated PASP  - CXR with R pleural effusion  - pulm consulted, no need for thoracentesis at this point, trapped lung, patient also declining thoracentesis     Abnormal EKG  Slow Aflutter  - cardiology consult,  - discussed DOAC with family, concern for fall risk, has been more unsteady here, will not start AC at this time, can reconsider if mobility improves  - monitor on tele     Hx CVA base on MRI  - patient/family denies hx of CVA, prior MRI 2017 shows chronic infarct  - has been on ASA/plavix- will continue     HTN  - BP stable     HL  - statin    Hx Macular degeneration     ACP  - CODE- DNR/comfort- confirmed with patient and daughter/POA  - POA- daughter Tammie- updated via phone 7/3  - live at home alone with caregiver  - plan for SNF on discharge     FN:  - IVF: none  - Diet: cardiac     DVT Prophy: SCD,  HSQ  Lines: PIV     Dispo: pending clinical course     Outpatient records or previous hospital records reviewed.      Further recommendations pending patient's clinical course.  [facility] hospitalist to continue to follow patient while in house     Patient and/or patient's family given opportunity to ask questions and note understanding and agreeing with therapeutic plan as outlined     MD KELLY [facility] Hospitalist  Answering Service number: [phone]     Subjective:      Breathing feels the same, requiring 4-6L NC. Does not want thoracentesis.     OBJECTIVE:    Blood pressure 135/61, pulse 55, temperature 98 °F (36.7 °C), temperature source Oral, resp. rate 18, height 5' 4.02\" (1.626 m), weight 153 lb 8 oz (69.6 kg), SpO2 93%, not currently breastfeeding.    Temp:  [97.5 °F (36.4 °C)-98.7 °F (37.1 °C)] 98 °F (36.7 °C)  Pulse:  [55-65] 55  Resp:  [18] 18  BP: (120-139)/(53-79) 135/61  SpO2:  [91 %-96 %] 93 %      Intake/Output:    Intake/Output Summary (Last 24 hours) at 7/3/2024 1243  Last data filed at 7/3/2024 1047  Gross per 24 hour   Intake 120 ml   Output 1700 ml   Net -1580 ml       Last 3 Weights   07/02/24 0555 153 lb 8 oz (69.6 kg)   07/01/24 1030 152 lb 8 oz (69.2 kg)   03/14/22 1521 181 lb (82.1 kg)   09/24/21 1123 177 lb (80.3 kg)     Exam  GEN: elderly female in NAD, hard of hearing  HEENT: EOMI  Pulm: decreased breath sounds at bases  CV: RRR, no murmurs  ABD: Soft, non-tender, non-distended, +BS  SKIN: warm, dry, mild erythema bilaterally, chronic venous stasis skin changes  EXT: 1-2+ pitting edema BLE    Data Review:       Labs:     Recent Labs   Lab 07/01/24  0354 07/02/24  0633 07/03/24  0641   RBC 4.55 3.95 3.73*   HGB 13.0 11.0* 10.7*   HCT 39.6 34.3* 32.5*   MCV 87.0 86.8 87.1   MCH 28.6 27.8 28.7   MCHC 32.8 32.1 32.9   RDW 15.7* 15.9* 15.5*   NEPRELIM 7.22 6.09 6.88   WBC 9.9 9.3 9.0   .0 285.0 252.0         Recent Labs   Lab 07/01/24  0354 07/02/24  0633 07/03/24  0641   * 107*  107* 106*   BUN 25* 27*  27* 31*   CREATSERUM 1.06* 1.14*  1.14* 1.09*   EGFRCR 48* 44*  44* 46*   CA 9.3 9.1  9.1 9.0    134*  134* 134*   K 4.8 4.0  4.0 3.8    97*  97* 97*   CO2 27.0 29.0  29.0 28.0       Recent Labs   Lab 07/02/24  0633   ALT 7*   AST 16   ALB 3.8         Imaging:  US VENOUS DOPPLER LEG BILAT - DIA IMG (CPT=93970)    Result Date: 7/1/2024  CONCLUSION: No evidence of acute deep venous thrombosis in the imaged veins of the  bilateral lower extremities.   Preliminary report was given by [facility].  There are no clinically significant discrepancies.    elm-remote   Dictated by (CST): Jerzy Quigley MD on 7/01/2024 at 9:22 AM     Finalized by (CST): Jerzy Quigley MD on 7/01/2024 at 9:23 AM          XR CHEST AP PORTABLE  (CPT=71045)    Result Date: 7/1/2024  CONCLUSION:   Right pleural effusion and associated atelectasis.  Pulmonary edema.    Preliminary report was given by [facility].    elm-remote    Dictated by (CST): Jerzy Quigley MD on 7/01/2024 at 8:46 AM     Finalized by (CST): Jerzy Quigley MD on 7/01/2024 at 8:52 AM          CT CHEST PE AORTA (IV ONLY) (CPT=71260)    Result Date: 7/1/2024  CONCLUSION:   1. Moderate right pleural effusion with pleural thickening and enhancement suggestive of empyema.  Moderate right lung atelectasis is seen.  Underlying infection is a possibility.  2. No evidence of acute pulmonary embolism to the 1st subsegmental pulmonary artery level.  3. No aneurysm of the thoracic aorta.  Two penetrating ulcers/small dissections are seen at the proximal and distal descending aorta.  4. Mosaic ground-glass opacities which may indicate pulmonary edema or air trapping.  5. Marked cardiomegaly.  6. Large hiatal hernia. Additional chronic or incidental findings are described in the body of this report.     elm-remote   Dictated by (CST): Jerzy Quigley MD on 7/01/2024 at 7:22 AM     Finalized by (CST): Jerzy Quigley MD on 7/01/2024 at 7:31 AM             Meds:     INPATIENT MEDICATIONS    Scheduled Medications:      potassium chloride, 40 mEq, Once  [Held by provider] amLODIPine, 5 mg, BID  clopidogrel, 75 mg, Daily  escitalopram, 10 mg, Daily  latanoprost, 1 drop, Nightly  metoprolol succinate ER, 50 mg, Daily  pantoprazole, 20 mg, QAM AC  heparin, 5,000 Units, Q8H JOAN  furosemide, 40 mg, BID (Diuretic)  aspirin, 81 mg, Daily  docusate sodium, 100 mg, BID            Drips:       PRN  Medications  camphor/menthol/methyl salicylate, , TID PRN  acetaminophen, 500 mg, Q4H PRN  melatonin, 3 mg, Nightly PRN  polyethylene glycol (PEG 3350), 17 g, Daily PRN  sennosides, 17.2 mg, Nightly PRN  bisacodyl, 10 mg, Daily PRN  fleet enema, 1 enema, Once PRN  ondansetron, 4 mg, Q6H PRN  metoclopramide, 10 mg, Q8H PRN

## 2024-07-03 NOTE — CONGREGATE LIVING REVIEW
Critical access hospital Living Authorization    The [facility] Review Committee has reviewed this case and the patient IS APPROVED for discharge to a facility for Short Term Skilled once the following procedure is followed:     - The physician discharge instructions (contained within the [facility] note for SNF) must inlcude the below appropriate and approved COVID instructions to the facility    For questions regarding CLRC approval process, please contact the CM assigned to the case.  For questions regarding RN discharge workflow, please contact the unit Clinical Leader.

## 2024-07-03 NOTE — PROGRESS NOTES
[facility]  part of [facility]    Cardiology Progress Note    Sharyn Mayen Patient Status:  Inpatient    1928 MRN [id number]   Location [facility] 3W/SW Attending Sofia Cuenca MD   Hosp Day # 2 PCP Jose E Varghese MD       Impression/Plan:  89 year old female presenting with:     Dyspnea, probably a component of CHF on top of chronic right pleural effusion (exudative in the past)  Acute on chronic diastolic CHF (EF 60% this admission)  Atrial tachycardia (slow atrial flutter)  Small \"penetrating ulcers\" noted descending aoarta on CT chest, asymptomatic, unclear significance  HTN, controlled  HL, on statin  History of CVA  Recent herpes zoster     - Cont IV diuresis with lasix 40mg bid; strict I/Os, monitoring of renal function and electrolytes  - Cont asa, plavix, bb; indication for plavix unclear at this time (patient unaware, possible 2/2 prior CVA seen on MRI brain).  Given atrial flutter on admission consideration should be given to doac in place of dapt for further stroke prevention if consistent with goals of care.  Had extensive discussion with daughterTammie, today re: risks/benefits/indications of systemic anticoagulation.  Will monitor patient's clinical progress and PT eval and if not deemed to be high fall risk then benefits likely outweigh risks and will replace dapt with doac.  Daughter in agreement  - Pleural effusion as per pulm  - Monitor on tele  - Will follow     Subjective: No events overnight.  Today patient denies chest pain, palpitations, dyspnea.  +Ongoing neck and back pain.    Patient Active Problem List   Diagnosis    Essential hypertension, malignant    Impingement syndrome of shoulder    Elevated cholesterol    Pulmonary HTN (HCC)    Primary localized osteoarthrosis, lower leg    Degenerative disc disease, lumbar    Aortic stenosis, mild    CHF (congestive heart failure), NYHA class I, chronic, diastolic (HCC)    Hair loss    Benign  hypertension with CKD (chronic kidney disease) stage III (HCC)    Stress incontinence in female    Ischemic colitis (HCC)    Adnexal cyst    Ocular migraine with status migrainosus    Cough due to ACE inhibitor    Vitamin D deficiency    Bilateral primary osteoarthritis of knee    Recurrent major depressive disorder, in partial remission (HCC)    DNR (do not resuscitate)    Tachycardia-bradycardia syndrome (HCC)    Acute exacerbation of CHF (congestive heart failure) (HCC)    Shortness of breath    Leg swelling    Hypoxia       Objective:   Temp: 98 °F (36.7 °C)  Pulse: 55  Resp: 18  BP: 135/61    Intake/Output:     Intake/Output Summary (Last 24 hours) at 7/3/2024 0956  Last data filed at 7/3/2024 0522  Gross per 24 hour   Intake 360 ml   Output 1200 ml   Net -840 ml       Last 3 Weights   07/02/24 0555 153 lb 8 oz (69.6 kg)   07/01/24 1030 152 lb 8 oz (69.2 kg)   03/14/22 1521 181 lb (82.1 kg)   09/24/21 1123 177 lb (80.3 kg)       Tele: NSR, PAC, PVC    Physical Exam:    General: Alert and oriented x 3. No apparent distress. No respiratory or constitutional distress.  HEENT: Normocephalic, anicteric sclera, neck supple, no thyromegaly or adenopathy.  Neck: No JVD, carotids 2+, no bruits.  Cardiac: Regular rate and rhythm. S1, S2 normal. No murmur, pericardial rub, S3, thrill, heave or extra cardiac sounds.  Lungs: Clear without wheezes, rales, rhonchi or dullness.  Normal excursions and effort.  Abdomen: Soft, non-tender. No organosplenomegally, mass or rebound, BS-present.  Extremities: Without clubbing, cyanosis or edema.  Peripheral pulses are 2+.  Neurologic: Alert and oriented, normal affect. No motor or coordinational deficit.  Skin: Warm and dry.     Laboratory/Data:    Labs:         Recent Labs   Lab 07/01/24  0354 07/02/24  0633 07/03/24  0641   WBC 9.9 9.3 9.0   HGB 13.0 11.0* 10.7*   MCV 87.0 86.8 87.1   .0 285.0 252.0       Recent Labs   Lab 07/01/24  0354 07/02/24  0633 07/03/24  0641     134*  134* 134*   K 4.8 4.0  4.0 3.8    97*  97* 97*   CO2 27.0 29.0  29.0 28.0   BUN 25* 27*  27* 31*   CREATSERUM 1.06* 1.14*  1.14* 1.09*   CA 9.3 9.1  9.1 9.0   MG  --  1.9 2.0   * 107*  107* 106*       Recent Labs   Lab 07/02/24  0633   ALT 7*   AST 16   ALB 3.8       No results for input(s): \"TROP\" in the last 168 hours.    Allergies:   Allergies   Allergen Reactions    Aceinhibitors [Ace Inhibitors] Coughing       Medications:  Current Facility-Administered Medications   Medication Dose Route Frequency    potassium chloride (Klor-Con M20) tab 40 mEq  40 mEq Oral Once    acetaminophen (Tylenol Extra Strength) tab 500 mg  500 mg Oral Q4H PRN    [Held by provider] amLODIPine (Norvasc) tab 5 mg  5 mg Oral BID    clopidogrel (Plavix) tab 75 mg  75 mg Oral Daily    escitalopram (Lexapro) tab 10 mg  10 mg Oral Daily    latanoprost (Xalatan) 0.005 % ophthalmic solution 1 drop  1 drop Both Eyes Nightly    metoprolol succinate ER (Toprol XL) 24 hr tab 50 mg  50 mg Oral Daily    pantoprazole (Protonix) DR tab 20 mg  20 mg Oral QAM AC    heparin (Porcine) 5000 UNIT/ML injection 5,000 Units  5,000 Units Subcutaneous Q8H JOAN    melatonin tab 3 mg  3 mg Oral Nightly PRN    polyethylene glycol (PEG 3350) (Miralax) 17 g oral packet 17 g  17 g Oral Daily PRN    sennosides (Senokot) tab 17.2 mg  17.2 mg Oral Nightly PRN    bisacodyl (Dulcolax) 10 MG rectal suppository 10 mg  10 mg Rectal Daily PRN    fleet enema (Fleet) 7-19 GM/118ML rectal enema 133 mL  1 enema Rectal Once PRN    ondansetron (Zofran) 4 MG/2ML injection 4 mg  4 mg Intravenous Q6H PRN    metoclopramide (Reglan) 5 mg/mL injection 10 mg  10 mg Intravenous Q8H PRN    furosemide (Lasix) 10 mg/mL injection 40 mg  40 mg Intravenous BID (Diuretic)    aspirin chewable tab 81 mg  81 mg Oral Daily    docusate sodium (Colace) cap 100 mg  100 mg Oral BID       Aftab Meek MD  7/3/2024  9:56 AM

## 2024-07-03 NOTE — CM/SW NOTE
10:30AM  SW met w/ pt at bedside to discuss ASA vs Home w/ HH.    Pt not feeling well today. SW briefly explained both types of services.     ASA and HH lists of quality data left at bedside. SW left name and direct phone # for pt and dtr to contact if they have any further questions.    GUERDA sent message to NP Tammie inquiring about Palliative Care consult as well. MD to discuss w/ pt's family.    01:20PM  Received call from pt's dtr Tammie. SW met w/ Tammie outside pt's room.    Discussed ASA list of quality data as well as HH. SW explained ASA facility can help arrange HH when pt is closer to [place] home.    Explained quality data and confirmed ideally would get top 3 choices from her if possible. They may be interested in [place]. Pt's dtr agreed to review rehab list and call SW w/ their top choices. She is aware that she can leave a Vmail w/ the choices if SW is not available at that time.    UPDATE: Received [facility] for [facility]. Referral sent in [facility].    PLAN: [facility] w/ [facility] - pending choice, [facility] agency & clinical course      SW/CM to remain available for support and/or discharge planning.         Torie Alex, MSW, LSW [id number]

## 2024-07-04 LAB
ANION GAP SERPL CALC-SCNC: 6 MMOL/L (ref 0–18)
BASOPHILS # BLD AUTO: 0.04 X10(3) UL (ref 0–0.2)
BASOPHILS NFR BLD AUTO: 0.4 %
BUN BLD-MCNC: 33 MG/DL (ref 9–23)
BUN/CREAT SERPL: 31.7 (ref 10–20)
CALCIUM BLD-MCNC: 9 MG/DL (ref 8.7–10.4)
CHLORIDE SERPL-SCNC: 98 MMOL/L (ref 98–112)
CO2 SERPL-SCNC: 32 MMOL/L (ref 21–32)
CREAT BLD-MCNC: 1.04 MG/DL
DEPRECATED RDW RBC AUTO: 50.5 FL (ref 35.1–46.3)
EGFRCR SERPLBLD CKD-EPI 2021: 49 ML/MIN/1.73M2 (ref 60–?)
EOSINOPHIL # BLD AUTO: 0.12 X10(3) UL (ref 0–0.7)
EOSINOPHIL NFR BLD AUTO: 1.2 %
ERYTHROCYTE [DISTWIDTH] IN BLOOD BY AUTOMATED COUNT: 15.6 % (ref 11–15)
GLUCOSE BLD-MCNC: 135 MG/DL (ref 70–99)
HCT VFR BLD AUTO: 34.9 %
HGB BLD-MCNC: 11.1 G/DL
IMM GRANULOCYTES # BLD AUTO: 0.03 X10(3) UL (ref 0–1)
IMM GRANULOCYTES NFR BLD: 0.3 %
LYMPHOCYTES # BLD AUTO: 1.09 X10(3) UL (ref 1–4)
LYMPHOCYTES NFR BLD AUTO: 10.9 %
MAGNESIUM SERPL-MCNC: 2.2 MG/DL (ref 1.6–2.6)
MCH RBC QN AUTO: 28.5 PG (ref 26–34)
MCHC RBC AUTO-ENTMCNC: 31.8 G/DL (ref 31–37)
MCV RBC AUTO: 89.5 FL
MONOCYTES # BLD AUTO: 0.93 X10(3) UL (ref 0.1–1)
MONOCYTES NFR BLD AUTO: 9.3 %
NEUTROPHILS # BLD AUTO: 7.82 X10 (3) UL (ref 1.5–7.7)
NEUTROPHILS # BLD AUTO: 7.82 X10(3) UL (ref 1.5–7.7)
NEUTROPHILS NFR BLD AUTO: 77.9 %
OSMOLALITY SERPL CALC.SUM OF ELEC: 291 MOSM/KG (ref 275–295)
PLATELET # BLD AUTO: 277 10(3)UL (ref 150–450)
POTASSIUM SERPL-SCNC: 4.3 MMOL/L (ref 3.5–5.1)
POTASSIUM SERPL-SCNC: 4.3 MMOL/L (ref 3.5–5.1)
RBC # BLD AUTO: 3.9 X10(6)UL
SODIUM SERPL-SCNC: 136 MMOL/L (ref 136–145)
WBC # BLD AUTO: 10 X10(3) UL (ref 4–11)

## 2024-07-04 PROCEDURE — 84132 ASSAY OF SERUM POTASSIUM: CPT | Performed by: HOSPITALIST

## 2024-07-04 PROCEDURE — 85025 COMPLETE CBC W/AUTO DIFF WBC: CPT | Performed by: INTERNAL MEDICINE

## 2024-07-04 PROCEDURE — 83735 ASSAY OF MAGNESIUM: CPT | Performed by: INTERNAL MEDICINE

## 2024-07-04 PROCEDURE — 80048 BASIC METABOLIC PNL TOTAL CA: CPT | Performed by: INTERNAL MEDICINE

## 2024-07-04 RX ORDER — GABAPENTIN 100 MG/1
200 CAPSULE ORAL NIGHTLY
Status: DISCONTINUED | OUTPATIENT
Start: 2024-07-04 | End: 2024-07-06

## 2024-07-04 RX ORDER — GABAPENTIN 100 MG/1
100 CAPSULE ORAL 3 TIMES DAILY
Status: DISCONTINUED | OUTPATIENT
Start: 2024-07-04 | End: 2024-07-04 | Stop reason: SDUPTHER

## 2024-07-04 RX ORDER — GABAPENTIN 100 MG/1
100 CAPSULE ORAL 2 TIMES DAILY
Status: DISCONTINUED | OUTPATIENT
Start: 2024-07-04 | End: 2024-07-06

## 2024-07-04 NOTE — PLAN OF CARE
Problem: Patient Centered Care  Goal: Patient preferences are identified and integrated in the patient's plan of care  Description: Interventions:  - What would you like us to know as we care for you? I'm from home alone with caregiver   - Provide timely, complete, and accurate information to patient/family  - Incorporate patient and family knowledge, values, beliefs, and cultural backgrounds into the planning and delivery of care  - Encourage patient/family to participate in care and decision-making at the level they choose  - Honor patient and family perspectives and choices  Outcome: Progressing     Problem: Patient/Family Goals  Goal: Patient/Family Long Term Goal  Description: Patient's Long Term Goal: to go back home    Interventions:  - monitor vitals, labs, imaging  - diurese  - oxygen therapy  - follow medical regimen  - See additional Care Plan goals for specific interventions  Outcome: Progressing  Goal: Patient/Family Short Term Goal  Description: Patient's Short Term Goal: To not be SOB    Interventions:   - Follow medical regimen  -IV lasix  -cards consult  - See additional Care Plan goals for specific interventions  Outcome: Progressing     Problem: SAFETY ADULT - FALL  Goal: Free from fall injury  Description: INTERVENTIONS:  - Assess pt frequently for physical needs  - Identify cognitive and physical deficits and behaviors that affect risk of falls.  - [place] fall precautions as indicated by assessment.  - Educate pt/family on patient safety including physical limitations  - Instruct pt to call for assistance with activity based on assessment  - Modify environment to reduce risk of injury  - Provide assistive devices as appropriate  - Consider OT/PT consult to assist with strengthening/mobility  - Encourage toileting schedule  Outcome: Progressing     Problem: RESPIRATORY - ADULT  Goal: Achieves optimal ventilation and oxygenation  Description: INTERVENTIONS:  - Assess for changes in  respiratory status  - Assess for changes in mentation and behavior  - Position to facilitate oxygenation and minimize respiratory effort  - Oxygen supplementation based on oxygen saturation or ABGs  - Provide Smoking Cessation handout, if applicable  - Encourage broncho-pulmonary hygiene including cough, deep breathe, Incentive Spirometry  - Assess the need for suctioning and perform as needed  - Assess and instruct to report SOB or any respiratory difficulty  - Respiratory Therapy support as indicated  - Manage/alleviate anxiety  - Monitor for signs/symptoms of CO2 retention  Outcome: Progressing     Problem: CARDIOVASCULAR - ADULT  Goal: Maintains optimal cardiac output and hemodynamic stability  Description: INTERVENTIONS:  - Monitor vital signs, rhythm, and trends  - Monitor for bleeding, hypotension and signs of decreased cardiac output  - Evaluate effectiveness of vasoactive medications to optimize hemodynamic stability  - Monitor arterial and/or venous puncture sites for bleeding and/or hematoma  - Assess quality of pulses, skin color and temperature  - Assess for signs of decreased coronary artery perfusion - ex. Angina  - Evaluate fluid balance, assess for edema, trend weights  Outcome: Progressing  Goal: Absence of cardiac arrhythmias or at baseline  Description: INTERVENTIONS:  - Continuous cardiac monitoring, monitor vital signs, obtain 12 lead EKG if indicated  - Evaluate effectiveness of antiarrhythmic and heart rate control medications as ordered  - Initiate emergency measures for life threatening arrhythmias  - Monitor electrolytes and administer replacement therapy as ordered  Outcome: Progressing     Problem: HEMATOLOGIC - ADULT  Goal: Maintains hematologic stability  Description: INTERVENTIONS  - Assess for signs and symptoms of bleeding or hemorrhage  - Monitor labs and vital signs for trends  - Administer supportive blood products/factors, fluids and medications as ordered and appropriate  -  Administer supportive blood products/factors as ordered and appropriate  Outcome: Progressing  Goal: Free from bleeding injury  Description: (Example usage: patient with low platelets)  INTERVENTIONS:  - Avoid intramuscular injections, enemas and rectal medication administration  - Ensure safe mobilization of patient  - Hold pressure on venipuncture sites to achieve adequate hemostasis  - Assess for signs and symptoms of internal bleeding  - Monitor lab trends  - Patient is to report abnormal signs of bleeding to staff  - Avoid use of toothpicks and dental floss  - Use electric shaver for shaving  - Use soft bristle tooth brush  - Limit straining and forceful nose blowing  Outcome: Progressing     Up in chair for meals. Given pain medications. Given prn medications to help with stomach pain. Weaned down to 4 L of oxygen.

## 2024-07-04 NOTE — PROGRESS NOTES
[facility] Hospitalist Progress Note     CC: Hospital Follow up    PCP: Jose E Varghese MD       Assessment/Plan:     Principal Problem:    Shortness of breath  Active Problems:    Acute exacerbation of CHF (congestive heart failure) (HCC)    Leg swelling    Hypoxia    Ms. Mayen is a 89 yo F with PMH of HTN, HL, chronic diastolic CHF, recurrent R pleural effusion, CKD3 who presented with shortness of breath.      Acute hypoxic respiratory failure, on 4L now  Acute on chronic diastolic CHF  Recurrent R pleural effusion  - continue IV lasix 40 mg BID, monitor renal function  - cardiology consulted  - TTE with EF 60%, unable to assess diastolic dysfunction, elevated PASP  - CXR with R pleural effusion  - pulm consulted, no need for thoracentesis at this point, trapped lung, patient also declining thoracentesis     Abnormal EKG  Slow Aflutter  - cardiology consult,  - discussed DOAC with family, concern for fall risk, has been more unsteady here, will not start AC at this time, can reconsider if mobility improves  - monitor on tele     Hx CVA base on MRI  - patient/family denies hx of CVA, prior MRI 2017 shows chronic infarct  - has been on ASA/plavix- will continue     HTN  - BP stable     HL  - statin    Hx Macular degeneration     ACP  - CODE- DNR/comfort- confirmed with patient and daughter/POA  - POA- daughter Tammie- updated via phone 7/3  - live at home alone with caregiver  - plan for SNF on discharge     FN:  - IVF: none  - Diet: cardiac     DVT Prophy: SCD,  HSQ  Lines: PIV     Dispo: pending clinical course     Outpatient records or previous hospital records reviewed.      Further recommendations pending patient's clinical course.  [facility] hospitalist to continue to follow patient while in house     Patient and/or patient's family given opportunity to ask questions and note understanding and agreeing with therapeutic plan as outlined     DO CARLOS EDUARDO [facility] Hospitalist  Answering Service number:  [phone]     Subjective:     Breathing a little better. A lot of family at bedside. Not sleeping. Has been constipated as well    OBJECTIVE:    Blood pressure 150/85, pulse 61, temperature 98 °F (36.7 °C), temperature source Oral, resp. rate 20, height 5' 4.02\" (1.626 m), weight 153 lb 8 oz (69.6 kg), SpO2 94%, not currently breastfeeding.    Temp:  [97.9 °F (36.6 °C)-98 °F (36.7 °C)] 98 °F (36.7 °C)  Pulse:  [61-68] 61  Resp:  [16-20] 20  BP: (150-151)/(50-85) 150/85  SpO2:  [87 %-96 %] 94 %      Intake/Output:    Intake/Output Summary (Last 24 hours) at 7/4/2024 1355  Last data filed at 7/4/2024 1100  Gross per 24 hour   Intake 490 ml   Output 200 ml   Net 290 ml       Last 3 Weights   07/02/24 0555 153 lb 8 oz (69.6 kg)   07/01/24 1030 152 lb 8 oz (69.2 kg)   03/14/22 1521 181 lb (82.1 kg)   09/24/21 1123 177 lb (80.3 kg)     Exam  GEN: elderly female in NAD, hard of hearing  HEENT: EOMI  Pulm: decreased breath sounds at bases  CV: RRR, no murmurs  ABD: Soft, non-tender, non-distended, +BS  SKIN: warm, dry, mild erythema bilaterally, chronic venous stasis skin changes  EXT: 1-2+ pitting edema BLE    Data Review:       Labs:     Recent Labs   Lab 07/02/24  0633 07/03/24  0641 07/04/24  0717   RBC 3.95 3.73* 3.90   HGB 11.0* 10.7* 11.1*   HCT 34.3* 32.5* 34.9*   MCV 86.8 87.1 89.5   MCH 27.8 28.7 28.5   MCHC 32.1 32.9 31.8   RDW 15.9* 15.5* 15.6*   NEPRELIM 6.09 6.88 7.82*   WBC 9.3 9.0 10.0   .0 252.0 277.0         Recent Labs   Lab 07/02/24  0633 07/03/24  0641 07/04/24  0717   *  107* 106* 135*   BUN 27*  27* 31* 33*   CREATSERUM 1.14*  1.14* 1.09* 1.04*   EGFRCR 44*  44* 46* 49*   CA 9.1  9.1 9.0 9.0   *  134* 134* 136   K 4.0  4.0 3.8 4.3  4.3   CL 97*  97* 97* 98   CO2 29.0  29.0 28.0 32.0       Recent Labs   Lab 07/02/24  0633   ALT 7*   AST 16   ALB 3.8         Imaging:  No results found.      Meds:     INPATIENT MEDICATIONS    Scheduled Medications:      gabapentin, 100  mg, TID  gabapentin, 100 mg, Nightly  [Held by provider] amLODIPine, 5 mg, BID  clopidogrel, 75 mg, Daily  escitalopram, 10 mg, Daily  latanoprost, 1 drop, Nightly  metoprolol succinate ER, 50 mg, Daily  pantoprazole, 20 mg, QAM AC  heparin, 5,000 Units, Q8H JOAN  furosemide, 40 mg, BID (Diuretic)  aspirin, 81 mg, Daily  docusate sodium, 100 mg, BID            Drips:       PRN Medications  camphor/menthol/methyl salicylate, , TID PRN  alum-mag hydroxide-simethicone, 30 mL, QID PRN  acetaminophen, 500 mg, Q4H PRN  melatonin, 3 mg, Nightly PRN  polyethylene glycol (PEG 3350), 17 g, Daily PRN  sennosides, 17.2 mg, Nightly PRN  bisacodyl, 10 mg, Daily PRN  fleet enema, 1 enema, Once PRN  ondansetron, 4 mg, Q6H PRN  metoclopramide, 10 mg, Q8H PRN

## 2024-07-04 NOTE — PROGRESS NOTES
[facility]  part of [facility]    Cardiology Progress Note    Sharyn Mayen Patient Status:  Inpatient    1928 MRN [id number]   Location [facility] 3W/SW Attending Sofia Cuenca MD   Hosp Day # 3 PCP Jose E Varghese MD       Impression/Plan:  89 year old female presenting with:     Dyspnea, probably a component of CHF on top of chronic right pleural effusion (exudative in the past)  Acute on chronic diastolic CHF (EF 60% this admission)  Atrial tachycardia (slow atrial flutter)  Small \"penetrating ulcers\" noted descending aoarta on CT chest, asymptomatic, unclear significance  HTN, controlled  HL, on statin  History of CVA  Recent herpes zoster     - Cont IV diuresis with lasix 40mg bid; strict I/Os, monitoring of renal function and electrolytes.   - Wean O2 as able, 6L -> 4L today  - Cont asa, plavix, bb; indication for plavix unclear at this time (patient unaware, possible 2/2 prior CVA seen on MRI brain).  Given atrial flutter on admission consideration should be given to doac in place of dapt for further stroke prevention if consistent with goals of care.  Dr. Meek discussed with daughter Tammie risks/benefits/indications of systemic anticoagulation.  Will monitor patient's clinical progress and PT eval and if not deemed to be high fall risk then benefits likely outweigh risks and will replace dapt with doac.  Daughter in agreement  - Pleural effusion as per pulm  - Monitor on tele  - Will follow     Subjective: No events overnight.  Today patient denies chest pain, palpitations, dyspnea.  Persistent neck and upper back pain.    Patient Active Problem List   Diagnosis    Essential hypertension, malignant    Impingement syndrome of shoulder    Elevated cholesterol    Pulmonary HTN (HCC)    Primary localized osteoarthrosis, lower leg    Degenerative disc disease, lumbar    Aortic stenosis, mild    CHF (congestive heart failure), NYHA class I, chronic, diastolic (HCC)     Hair loss    Benign hypertension with CKD (chronic kidney disease) stage III (HCC)    Stress incontinence in female    Ischemic colitis (HCC)    Adnexal cyst    Ocular migraine with status migrainosus    Cough due to ACE inhibitor    Vitamin D deficiency    Bilateral primary osteoarthritis of knee    Recurrent major depressive disorder, in partial remission ([facility])    DNR (do not resuscitate)    Tachycardia-bradycardia syndrome (HCC)    Acute exacerbation of CHF (congestive heart failure) ([facility])    Shortness of breath    Leg swelling    Hypoxia       Objective:   Temp: 98 °F (36.7 °C)  Pulse: 65  Resp: 20  BP: 151/50    Intake/Output:     Intake/Output Summary (Last 24 hours) at 7/4/2024 0844  Last data filed at 7/4/2024 0650  Gross per 24 hour   Intake 250 ml   Output 700 ml   Net -450 ml       Last 3 Weights   07/02/24 0555 153 lb 8 oz (69.6 kg)   07/01/24 1030 152 lb 8 oz (69.2 kg)   03/14/22 1521 181 lb (82.1 kg)   09/24/21 1123 177 lb (80.3 kg)       Tele: NSR, PAC, PVC    Physical Exam:    General: Alert and oriented x 3. No apparent distress. No respiratory or constitutional distress.  HEENT: Normocephalic, anicteric sclera, neck supple  Neck: No JVD  Cardiac: Regular rate and rhythm. S1, S2 normal. No murmur, pericardial rub, S3, thrill, heave or extra cardiac sounds.  Lungs: Clear without wheezes, rales, rhonchi or dullness.  Normal excursions and effort.  Abdomen: Soft, non-tender. Nondistended, nl BS  Extremities: Without clubbing, cyanosis or edema.    Neurologic: Alert and oriented, normal affect. No motor or coordinational deficit.  Skin: Warm and dry.     Laboratory/Data:    Labs:         Recent Labs   Lab 07/01/24  0354 07/02/24  0633 07/03/24  0641 07/04/24  0717   WBC 9.9 9.3 9.0 10.0   HGB 13.0 11.0* 10.7* 11.1*   MCV 87.0 86.8 87.1 89.5   .0 285.0 252.0 277.0       Recent Labs   Lab 07/01/24  0354 07/02/24  0633 07/03/24  0641 07/04/24  0717    134*  134* 134* 136   K 4.8 4.0  4.0 3.8  4.3  4.3    97*  97* 97* 98   CO2 27.0 29.0  29.0 28.0 32.0   BUN 25* 27*  27* 31* 33*   CREATSERUM 1.06* 1.14*  1.14* 1.09* 1.04*   CA 9.3 9.1  9.1 9.0 9.0   MG  --  1.9 2.0 2.2   * 107*  107* 106* 135*       Recent Labs   Lab 07/02/24  0633   ALT 7*   AST 16   ALB 3.8       No results for input(s): \"TROP\" in the last 168 hours.    Allergies:   Allergies   Allergen Reactions    Aceinhibitors [Ace Inhibitors] Coughing       Medications:  Current Facility-Administered Medications   Medication Dose Route Frequency    camphor/menthol/methyl salicylate (Thera-Gesic) 10-15 % cream   Topical TID PRN    alum-mag hydroxide-simethicone (Maalox) 200-200-20 MG/5ML oral suspension 30 mL  30 mL Oral QID PRN    acetaminophen (Tylenol Extra Strength) tab 500 mg  500 mg Oral Q4H PRN    [Held by provider] amLODIPine (Norvasc) tab 5 mg  5 mg Oral BID    clopidogrel (Plavix) tab 75 mg  75 mg Oral Daily    escitalopram (Lexapro) tab 10 mg  10 mg Oral Daily    latanoprost (Xalatan) 0.005 % ophthalmic solution 1 drop  1 drop Both Eyes Nightly    metoprolol succinate ER (Toprol XL) 24 hr tab 50 mg  50 mg Oral Daily    pantoprazole (Protonix) DR tab 20 mg  20 mg Oral QAM AC    heparin (Porcine) 5000 UNIT/ML injection 5,000 Units  5,000 Units Subcutaneous Q8H JOAN    melatonin tab 3 mg  3 mg Oral Nightly PRN    polyethylene glycol (PEG 3350) (Miralax) 17 g oral packet 17 g  17 g Oral Daily PRN    sennosides (Senokot) tab 17.2 mg  17.2 mg Oral Nightly PRN    bisacodyl (Dulcolax) 10 MG rectal suppository 10 mg  10 mg Rectal Daily PRN    fleet enema (Fleet) 7-19 GM/118ML rectal enema 133 mL  1 enema Rectal Once PRN    ondansetron (Zofran) 4 MG/2ML injection 4 mg  4 mg Intravenous Q6H PRN    metoclopramide (Reglan) 5 mg/mL injection 10 mg  10 mg Intravenous Q8H PRN    furosemide (Lasix) 10 mg/mL injection 40 mg  40 mg Intravenous BID (Diuretic)    aspirin chewable tab 81 mg  81 mg Oral Daily    docusate sodium (Colace) cap  100 mg  100 mg Oral BID

## 2024-07-04 NOTE — PLAN OF CARE
Patient resting in chair overnight. Sharyn, requested pain medication for her right side. She told this RN, \"I feel like it is the same pain I had with the shingles.\" This RN explained that she could still feel some pain.     0551: This morning during am vitals, patient told this RN, \"This is the worst place ever, I want to leave, no one is helping with this pain.\" Patient continued to tell this RN, \"I have the kaitlin at home and I have been taking it before bed.\" This RN just looked into recently dispensed medications and gabapentin was recently dispensed to patient. Will follow up with MD and day shift RN regarding home meds.    Problem: Patient Centered Care  Goal: Patient preferences are identified and integrated in the patient's plan of care  Description: Interventions:  - What would you like us to know as we care for you? I'm from home alone with caregiver   - Provide timely, complete, and accurate information to patient/family  - Incorporate patient and family knowledge, values, beliefs, and cultural backgrounds into the planning and delivery of care  - Encourage patient/family to participate in care and decision-making at the level they choose  - Honor patient and family perspectives and choices  Outcome: Progressing     Problem: Patient/Family Goals  Goal: Patient/Family Long Term Goal  Description: Patient's Long Term Goal: to go back home    Interventions:  - monitor vitals, labs, imaging  - diurese  - oxygen therapy  - follow medical regimen  - See additional Care Plan goals for specific interventions  Outcome: Progressing  Goal: Patient/Family Short Term Goal  Description: Patient's Short Term Goal: To not be SOB    Interventions:   - Follow medical regimen  -IV lasix  -cards consult  - See additional Care Plan goals for specific interventions  Outcome: Progressing     Problem: SAFETY ADULT - FALL  Goal: Free from fall injury  Description: INTERVENTIONS:  - Assess pt frequently for physical needs  -  Identify cognitive and physical deficits and behaviors that affect risk of falls.  - Pipestem fall precautions as indicated by assessment.  - Educate pt/family on patient safety including physical limitations  - Instruct pt to call for assistance with activity based on assessment  - Modify environment to reduce risk of injury  - Provide assistive devices as appropriate  - Consider OT/PT consult to assist with strengthening/mobility  - Encourage toileting schedule  Outcome: Progressing     Problem: RESPIRATORY - ADULT  Goal: Achieves optimal ventilation and oxygenation  Description: INTERVENTIONS:  - Assess for changes in respiratory status  - Assess for changes in mentation and behavior  - Position to facilitate oxygenation and minimize respiratory effort  - Oxygen supplementation based on oxygen saturation or ABGs  - Provide Smoking Cessation handout, if applicable  - Encourage broncho-pulmonary hygiene including cough, deep breathe, Incentive Spirometry  - Assess the need for suctioning and perform as needed  - Assess and instruct to report SOB or any respiratory difficulty  - Respiratory Therapy support as indicated  - Manage/alleviate anxiety  - Monitor for signs/symptoms of CO2 retention  Outcome: Progressing     Problem: CARDIOVASCULAR - ADULT  Goal: Maintains optimal cardiac output and hemodynamic stability  Description: INTERVENTIONS:  - Monitor vital signs, rhythm, and trends  - Monitor for bleeding, hypotension and signs of decreased cardiac output  - Evaluate effectiveness of vasoactive medications to optimize hemodynamic stability  - Monitor arterial and/or venous puncture sites for bleeding and/or hematoma  - Assess quality of pulses, skin color and temperature  - Assess for signs of decreased coronary artery perfusion - ex. Angina  - Evaluate fluid balance, assess for edema, trend weights  Outcome: Progressing  Goal: Absence of cardiac arrhythmias or at baseline  Description: INTERVENTIONS:  -  Continuous cardiac monitoring, monitor vital signs, obtain 12 lead EKG if indicated  - Evaluate effectiveness of antiarrhythmic and heart rate control medications as ordered  - Initiate emergency measures for life threatening arrhythmias  - Monitor electrolytes and administer replacement therapy as ordered  Outcome: Progressing     Problem: HEMATOLOGIC - ADULT  Goal: Maintains hematologic stability  Description: INTERVENTIONS  - Assess for signs and symptoms of bleeding or hemorrhage  - Monitor labs and vital signs for trends  - Administer supportive blood products/factors, fluids and medications as ordered and appropriate  - Administer supportive blood products/factors as ordered and appropriate  Outcome: Progressing  Goal: Free from bleeding injury  Description: (Example usage: patient with low platelets)  INTERVENTIONS:  - Avoid intramuscular injections, enemas and rectal medication administration  - Ensure safe mobilization of patient  - Hold pressure on venipuncture sites to achieve adequate hemostasis  - Assess for signs and symptoms of internal bleeding  - Monitor lab trends  - Patient is to report abnormal signs of bleeding to staff  - Avoid use of toothpicks and dental floss  - Use electric shaver for shaving  - Use soft bristle tooth brush  - Limit straining and forceful nose blowing  Outcome: Progressing

## 2024-07-05 LAB
ANION GAP SERPL CALC-SCNC: 5 MMOL/L (ref 0–18)
BASOPHILS # BLD AUTO: 0.04 X10(3) UL (ref 0–0.2)
BASOPHILS NFR BLD AUTO: 0.5 %
BUN BLD-MCNC: 32 MG/DL (ref 9–23)
BUN/CREAT SERPL: 35.2 (ref 10–20)
CALCIUM BLD-MCNC: 9.1 MG/DL (ref 8.7–10.4)
CHLORIDE SERPL-SCNC: 97 MMOL/L (ref 98–112)
CO2 SERPL-SCNC: 33 MMOL/L (ref 21–32)
CREAT BLD-MCNC: 0.91 MG/DL
DEPRECATED RDW RBC AUTO: 51.8 FL (ref 35.1–46.3)
EGFRCR SERPLBLD CKD-EPI 2021: 58 ML/MIN/1.73M2 (ref 60–?)
EOSINOPHIL # BLD AUTO: 0.24 X10(3) UL (ref 0–0.7)
EOSINOPHIL NFR BLD AUTO: 2.8 %
ERYTHROCYTE [DISTWIDTH] IN BLOOD BY AUTOMATED COUNT: 15.8 % (ref 11–15)
GLUCOSE BLD-MCNC: 105 MG/DL (ref 70–99)
HCT VFR BLD AUTO: 34.3 %
HGB BLD-MCNC: 10.8 G/DL
IMM GRANULOCYTES # BLD AUTO: 0.03 X10(3) UL (ref 0–1)
IMM GRANULOCYTES NFR BLD: 0.3 %
LYMPHOCYTES # BLD AUTO: 1.07 X10(3) UL (ref 1–4)
LYMPHOCYTES NFR BLD AUTO: 12.4 %
MAGNESIUM SERPL-MCNC: 2.1 MG/DL (ref 1.6–2.6)
MCH RBC QN AUTO: 28.4 PG (ref 26–34)
MCHC RBC AUTO-ENTMCNC: 31.5 G/DL (ref 31–37)
MCV RBC AUTO: 90.3 FL
MONOCYTES # BLD AUTO: 0.99 X10(3) UL (ref 0.1–1)
MONOCYTES NFR BLD AUTO: 11.5 %
NEUTROPHILS # BLD AUTO: 6.23 X10 (3) UL (ref 1.5–7.7)
NEUTROPHILS # BLD AUTO: 6.23 X10(3) UL (ref 1.5–7.7)
NEUTROPHILS NFR BLD AUTO: 72.5 %
OSMOLALITY SERPL CALC.SUM OF ELEC: 287 MOSM/KG (ref 275–295)
PLATELET # BLD AUTO: 278 10(3)UL (ref 150–450)
POTASSIUM SERPL-SCNC: 4 MMOL/L (ref 3.5–5.1)
RBC # BLD AUTO: 3.8 X10(6)UL
SODIUM SERPL-SCNC: 135 MMOL/L (ref 136–145)
WBC # BLD AUTO: 8.6 X10(3) UL (ref 4–11)

## 2024-07-05 PROCEDURE — 80048 BASIC METABOLIC PNL TOTAL CA: CPT | Performed by: INTERNAL MEDICINE

## 2024-07-05 PROCEDURE — 97116 GAIT TRAINING THERAPY: CPT

## 2024-07-05 PROCEDURE — 85025 COMPLETE CBC W/AUTO DIFF WBC: CPT | Performed by: INTERNAL MEDICINE

## 2024-07-05 PROCEDURE — 97535 SELF CARE MNGMENT TRAINING: CPT

## 2024-07-05 PROCEDURE — 97530 THERAPEUTIC ACTIVITIES: CPT

## 2024-07-05 PROCEDURE — 83735 ASSAY OF MAGNESIUM: CPT | Performed by: INTERNAL MEDICINE

## 2024-07-05 RX ORDER — TRAMADOL HYDROCHLORIDE 50 MG/1
25 TABLET ORAL EVERY 6 HOURS PRN
Status: DISCONTINUED | OUTPATIENT
Start: 2024-07-05 | End: 2024-07-06

## 2024-07-05 RX ORDER — METOLAZONE 2.5 MG/1
2.5 TABLET ORAL ONCE
Status: COMPLETED | OUTPATIENT
Start: 2024-07-05 | End: 2024-07-05

## 2024-07-05 NOTE — PROGRESS NOTES
[facility] Liaison received palliative referral for community PC services from Torie SHAW. Spoke with daughter Tammie, to discuss Residential PC services. Residential PC services discussed and daughter said she will agree with our services \"if needed\", after Rehab. Our team will reach out in a few weeks to F/U with patient/daughter.    Marga Kohler  Residential Liaison  [phone]

## 2024-07-05 NOTE — PROGRESS NOTES
[facility] Hospitalist Progress Note     CC: Hospital Follow up    PCP: Jose E Varghese MD       Assessment/Plan:     Principal Problem:    Shortness of breath  Active Problems:    Acute exacerbation of CHF (congestive heart failure) (HCC)    Leg swelling    Hypoxia    Ms. Mayen is a 89 yo F with PMH of HTN, HL, chronic diastolic CHF, recurrent R pleural effusion, CKD3 who presented with shortness of breath.      Acute hypoxic respiratory failure, on 4L now  Acute on chronic diastolic CHF  Recurrent R pleural effusion  - continue IV lasix 40 mg BID, monitor renal function  - cardiology consulted  - TTE with EF 60%, unable to assess diastolic dysfunction, elevated PASP  - CXR with R pleural effusion  - pulm consulted, no need for thoracentesis at this point, trapped lung, patient also declining thoracentesis     Abnormal EKG  Slow Aflutter  - cardiology consult,  - monitor on tele  - discussed DOAC with family and cardiology, to stop DAPT, family in agreement, will transition to reduced dose eliquis 2.5 mg BID given fall risk     Hx CVA base on MRI  - patient/family denies hx of CVA, prior MRI 2017 shows chronic infarct  - ASA/plavix stopped     HTN  - BP stable     HL  - statin    Hx Macular degeneration     ACP  - CODE- DNR/comfort- confirmed with patient and daughter/POA  - POA- daughter Tammie- updated via phone 7/5  - live at home alone with caregiver  - plan for SNF on discharge     FN:  - IVF: none  - Diet: cardiac     DVT Prophy: SCD,  eliquis  Lines: PIV     Dispo: pending clinical course     Outpatient records or previous hospital records reviewed.      Further recommendations pending patient's clinical course.  [facility] hospitalist to continue to follow patient while in house     Patient and/or patient's family given opportunity to ask questions and note understanding and agreeing with therapeutic plan as outlined     DO CARLOS EDUARDO [facility] Hospitalist  Answering Service number: [phone]     Subjective:      Breathing a little better. Slept last night, some back pain this morning    OBJECTIVE:    Blood pressure 140/51, pulse 63, temperature 97.8 °F (36.6 °C), temperature source Oral, resp. rate 20, height 5' 4.02\" (1.626 m), weight 155 lb (70.3 kg), SpO2 93%, not currently breastfeeding.    Temp:  [97.8 °F (36.6 °C)-98.3 °F (36.8 °C)] 97.8 °F (36.6 °C)  Pulse:  [59-64] 63  Resp:  [20] 20  BP: (130-140)/(50-64) 140/51  SpO2:  [91 %-93 %] 93 %      Intake/Output:    Intake/Output Summary (Last 24 hours) at 7/5/2024 1138  Last data filed at 7/5/2024 0900  Gross per 24 hour   Intake 210 ml   Output 1050 ml   Net -840 ml       Last 3 Weights   07/05/24 0539 155 lb (70.3 kg)   07/02/24 0555 153 lb 8 oz (69.6 kg)   07/01/24 1030 152 lb 8 oz (69.2 kg)   03/14/22 1521 181 lb (82.1 kg)   09/24/21 1123 177 lb (80.3 kg)     Exam  GEN: elderly female in NAD, hard of hearing  HEENT: EOMI  Pulm: decreased breath sounds at bases, crackles  CV: RRR, no murmurs  ABD: Soft, non-tender, non-distended, +BS  SKIN: warm, dry, mild erythema bilaterally, chronic venous stasis skin changes  EXT: 1-2+ pitting edema BLE    Data Review:       Labs:     Recent Labs   Lab 07/03/24  0641 07/04/24  0717 07/05/24  0656   RBC 3.73* 3.90 3.80   HGB 10.7* 11.1* 10.8*   HCT 32.5* 34.9* 34.3*   MCV 87.1 89.5 90.3   MCH 28.7 28.5 28.4   MCHC 32.9 31.8 31.5   RDW 15.5* 15.6* 15.8*   NEPRELIM 6.88 7.82* 6.23   WBC 9.0 10.0 8.6   .0 277.0 278.0         Recent Labs   Lab 07/03/24  0641 07/04/24  0717 07/05/24  0656   * 135* 105*   BUN 31* 33* 32*   CREATSERUM 1.09* 1.04* 0.91   EGFRCR 46* 49* 58*   CA 9.0 9.0 9.1   * 136 135*   K 3.8 4.3  4.3 4.0   CL 97* 98 97*   CO2 28.0 32.0 33.0*       Recent Labs   Lab 07/02/24  0633   ALT 7*   AST 16   ALB 3.8         Imaging:  No results found.      Meds:     INPATIENT MEDICATIONS    Scheduled Medications:      apixaban, 2.5 mg, BID  gabapentin, 200 mg, Nightly  gabapentin, 100 mg, BID  [Held by  provider] amLODIPine, 5 mg, BID  escitalopram, 10 mg, Daily  latanoprost, 1 drop, Nightly  metoprolol succinate ER, 50 mg, Daily  pantoprazole, 20 mg, QAM AC  furosemide, 40 mg, BID (Diuretic)  docusate sodium, 100 mg, BID            Drips:       PRN Medications  traMADol, 25 mg, Q6H PRN  lidocaine-menthol, 2 patch, Daily PRN  camphor/menthol/methyl salicylate, , TID PRN  alum-mag hydroxide-simethicone, 30 mL, QID PRN  acetaminophen, 500 mg, Q4H PRN  melatonin, 3 mg, Nightly PRN  polyethylene glycol (PEG 3350), 17 g, Daily PRN  sennosides, 17.2 mg, Nightly PRN  bisacodyl, 10 mg, Daily PRN  fleet enema, 1 enema, Once PRN  ondansetron, 4 mg, Q6H PRN  metoclopramide, 10 mg, Q8H PRN

## 2024-07-05 NOTE — CM/SW NOTE
07:35AM  Received Vmail overnight from pt's dtr Tammie  - their top ASA choice is [facility] at this time.    Updates sent in Aidin. [facility] notified and reserved.    09:20AM  Left Vmail for pt's dtr Tammie to inform her that SW received her Vmail over night. Name and phone # left in the event Tammie has additional questions.    SW spoke to [facility] ([facility]) set on WILL CALL through 7/8. PCS completed and  will need date added day of actual DC.    Received update from Marga amezcua/ [facility] - confirmed they are able to accept pt for services at DC.    PLAN: [facility], Ambulance on WC, PCS needs date - pending med clear      SW/CM to remain available for support and/or discharge planning.         Torie Alex, MSW, LSW [id number]

## 2024-07-05 NOTE — PROGRESS NOTES
[facility] Pulmonary, Critical Care and Sleep    Sharyn Mayen Patient Status:  Inpatient    1928 MRN [id number]   Location [facility] 3W/SW Attending Kim Waterman,    Hosp Day # 4 PCP Jose E Varghese MD     Date of Admission: 2024  3:40 AM    Admission Diagnosis: Shortness of breath [R06.02]  Leg swelling [M79.89]  Hypoxia [R09.02]    S: Feeling OK. Denies dyspnea.     Scheduled Medications:     apixaban  2.5 mg Oral BID    gabapentin  200 mg Oral Nightly    gabapentin  100 mg Oral BID    [Held by provider] amLODIPine  5 mg Oral BID    escitalopram  10 mg Oral Daily    latanoprost  1 drop Both Eyes Nightly    metoprolol succinate ER  50 mg Oral Daily    pantoprazole  20 mg Oral QAM AC    furosemide  40 mg Intravenous BID (Diuretic)    docusate sodium  100 mg Oral BID       Infusing Medications:      PRN Medications:    camphor/menthol/methyl salicylate    alum-mag hydroxide-simethicone    acetaminophen    melatonin    polyethylene glycol (PEG 3350)    sennosides    bisacodyl    fleet enema    ondansetron    metoclopramide    OBJECTIVE:  /51 (BP Location: Right arm)   Pulse 63   Temp 97.8 °F (36.6 °C) (Oral)   Resp 20   Ht 162.6 cm (5' 4.02\")   Wt 155 lb (70.3 kg)   SpO2 93%   BMI 26.59 kg/m²    Temp (24hrs), Av.1 °F (36.7 °C), Min:97.8 °F (36.6 °C), Max:98.3 °F (36.8 °C)             Wt Readings from Last 3 Encounters:   24 155 lb (70.3 kg)   22 181 lb (82.1 kg)   21 177 lb (80.3 kg)       I/O last 3 completed shifts:  In: 360 [P.O.:340; I.V.:20]  Out: 1250 [Urine:1250]  No intake/output data recorded.     O2: 4 LNC  General: NAD.   Neuro: Alert, no focal deficits.    HEENT: PERRL  Neck : No LAD  CV: RRR, nl S1, S2, no S4, S3 or murmur.   Lungs: Clear bilaterally.   Abd: Nontender, non distended.   Ext: No edema.   Skin: No rashes.     Recent Labs   Lab 24  0641 24  0717 24  0656   WBC 9.0 10.0 8.6   HGB 10.7* 11.1* 10.8*   HCT 32.5*  34.9* 34.3*   .0 277.0 278.0     Recent Labs   Lab 07/02/24  0633 07/03/24  0641 07/04/24  0717 07/05/24  0656   *  107* 106* 135* 105*   BUN 27*  27* 31* 33* 32*   CREATSERUM 1.14*  1.14* 1.09* 1.04* 0.91   CA 9.1  9.1 9.0 9.0 9.1   *  134* 134* 136 135*   K 4.0  4.0 3.8 4.3  4.3 4.0   CL 97*  97* 97* 98 97*   CO2 29.0  29.0 28.0 32.0 33.0*   AST 16  --   --   --    ALT 7*  --   --   --    ALB 3.8  --   --   --      No results for input(s): \"INR\", \"PTT\" in the last 168 hours.  No results for input(s): \"ABGPHT\", \"DXBHZD0H\", \"HCZDN4U\", \"ABGHCO3\", \"SITE\", \"DEV\", \"THGB\" in the last 168 hours.    COVID-19 Lab Results    COVID-19  Lab Results   Component Value Date    COVID19 Not Detected 07/01/2024    COVID19 NOT DETECTED 09/11/2021    COVID19 NOT DETECTED 07/08/2021       Pro-Calcitonin  No results for input(s): \"PCT\" in the last 168 hours.    Cardiac  No results for input(s): \"TROP\", \"PBNP\" in the last 168 hours.    Creatinine Kinase  No results for input(s): \"CK\" in the last 168 hours.    Inflammatory Markers  Recent Labs   Lab 07/01/24  0427   DDIMER 2.69*       Imaging:   CT chest 7/1:        Chest images personally reviewed.       Assessment/Plan   Right pleural effusion - s/p thora in the past notable for exudative fluid with negative cytology. Further workup previously deferred due to lack of symptoms, co-morbidities and age  - pleural effusion is stable when compared to prior imaging at [facility]. No further   intervention/workup  - Lung is likely trapped and effusion is chronic. Pt declines thoracentsis due to previous pain.  I would not recommend fluid removal at this point. I would only consider or possibly pleurx for symptomatic/palliative relief.   Hypoxic respiratory failure - d/t CHF, hypoventilation and atelectasis from effusion  - Desat screen pending  - Wean oxygen as tolerated; presently 4L  HFpEF  - diuresis  - further per cardiology & hospitalist  Chronic cough  - BD  protocol  Dispo  - PT eval  - desat screen  - Palliative care evaluation would be appropriate for symptom management.   Will follow peripherally over weekend.     My best regards,         Garfield Chadwick MD  [facility] Pulmonary, Critical Care and Sleep Medicine

## 2024-07-05 NOTE — PLAN OF CARE
Sharyn is AXO4, resting in chair. Recived IV lasix. Call light within reach, family at bedside. Frequent rounding by staff.     Problem: Patient Centered Care  Goal: Patient preferences are identified and integrated in the patient's plan of care  Description: Interventions:  - What would you like us to know as we care for you? I'm from home alone with caregiver   - Provide timely, complete, and accurate information to patient/family  - Incorporate patient and family knowledge, values, beliefs, and cultural backgrounds into the planning and delivery of care  - Encourage patient/family to participate in care and decision-making at the level they choose  - Honor patient and family perspectives and choices  Outcome: Progressing     Problem: Patient/Family Goals  Goal: Patient/Family Long Term Goal  Description: Patient's Long Term Goal: to go back home    Interventions:  - monitor vitals, labs, imaging  - diurese  - oxygen therapy  - follow medical regimen  - See additional Care Plan goals for specific interventions  Outcome: Progressing  Goal: Patient/Family Short Term Goal  Description: Patient's Short Term Goal: To not be SOB    Interventions:   - Follow medical regimen  -IV lasix  -cards consult  - See additional Care Plan goals for specific interventions  Outcome: Progressing     Problem: SAFETY ADULT - FALL  Goal: Free from fall injury  Description: INTERVENTIONS:  - Assess pt frequently for physical needs  - Identify cognitive and physical deficits and behaviors that affect risk of falls.  - [place] fall precautions as indicated by assessment.  - Educate pt/family on patient safety including physical limitations  - Instruct pt to call for assistance with activity based on assessment  - Modify environment to reduce risk of injury  - Provide assistive devices as appropriate  - Consider OT/PT consult to assist with strengthening/mobility  - Encourage toileting schedule  Outcome: Progressing     Problem:  RESPIRATORY - ADULT  Goal: Achieves optimal ventilation and oxygenation  Description: INTERVENTIONS:  - Assess for changes in respiratory status  - Assess for changes in mentation and behavior  - Position to facilitate oxygenation and minimize respiratory effort  - Oxygen supplementation based on oxygen saturation or ABGs  - Provide Smoking Cessation handout, if applicable  - Encourage broncho-pulmonary hygiene including cough, deep breathe, Incentive Spirometry  - Assess the need for suctioning and perform as needed  - Assess and instruct to report SOB or any respiratory difficulty  - Respiratory Therapy support as indicated  - Manage/alleviate anxiety  - Monitor for signs/symptoms of CO2 retention  Outcome: Progressing     Problem: CARDIOVASCULAR - ADULT  Goal: Maintains optimal cardiac output and hemodynamic stability  Description: INTERVENTIONS:  - Monitor vital signs, rhythm, and trends  - Monitor for bleeding, hypotension and signs of decreased cardiac output  - Evaluate effectiveness of vasoactive medications to optimize hemodynamic stability  - Monitor arterial and/or venous puncture sites for bleeding and/or hematoma  - Assess quality of pulses, skin color and temperature  - Assess for signs of decreased coronary artery perfusion - ex. Angina  - Evaluate fluid balance, assess for edema, trend weights  Outcome: Progressing  Goal: Absence of cardiac arrhythmias or at baseline  Description: INTERVENTIONS:  - Continuous cardiac monitoring, monitor vital signs, obtain 12 lead EKG if indicated  - Evaluate effectiveness of antiarrhythmic and heart rate control medications as ordered  - Initiate emergency measures for life threatening arrhythmias  - Monitor electrolytes and administer replacement therapy as ordered  Outcome: Progressing     Problem: HEMATOLOGIC - ADULT  Goal: Maintains hematologic stability  Description: INTERVENTIONS  - Assess for signs and symptoms of bleeding or hemorrhage  - Monitor labs and  vital signs for trends  - Administer supportive blood products/factors, fluids and medications as ordered and appropriate  - Administer supportive blood products/factors as ordered and appropriate  Outcome: Progressing  Goal: Free from bleeding injury  Description: (Example usage: patient with low platelets)  INTERVENTIONS:  - Avoid intramuscular injections, enemas and rectal medication administration  - Ensure safe mobilization of patient  - Hold pressure on venipuncture sites to achieve adequate hemostasis  - Assess for signs and symptoms of internal bleeding  - Monitor lab trends  - Patient is to report abnormal signs of bleeding to staff  - Avoid use of toothpicks and dental floss  - Use electric shaver for shaving  - Use soft bristle tooth brush  - Limit straining and forceful nose blowing  Outcome: Progressing

## 2024-07-05 NOTE — OCCUPATIONAL THERAPY NOTE
OCCUPATIONAL THERAPY TREATMENT NOTE - INPATIENT    Room Number: 304/304-A     Presenting Problem: SOB     Problem List  Principal Problem:    Shortness of breath  Active Problems:    Acute exacerbation of CHF (congestive heart failure) (HCC)    Leg swelling    Hypoxia      OCCUPATIONAL THERAPY ASSESSMENT   Patient demonstrates good  progress this session, goals remain in progress.    Patient continues to function below baseline with self care and functional mobility.   Contributing factors to remaining limitations include activity tolerance, endurance, deconditioning, generalized weakness.  Next session anticipate patient to progress with OT POC.  Occupational Therapy will continue to follow patient for duration of hospitalization.    Patient continues to benefit from continued skilled OT services: to promote return to prior level of function and safety with continuous assistance and gradual rehabilitative therapy .     PLAN  OT Treatment Plan: Balance activities;Energy conservation/work simplification techniques;ADL training;Functional transfer training;Endurance training;Cognitive reorientation;Patient/Family education;Patient/Family training;Compensatory technique education  OT Device Recommendations: TBD    SUBJECTIVE  I feel better in my own pajamas from home     OBJECTIVE  Precautions: Bed/chair alarm    WEIGHT BEARING RESTRICTION     PAIN ASSESSMENT  Rating: -- (Not quantified)  Location: neck  Management Techniques: Nurse notified    ACTIVITIES OF DAILY LIVING ASSESSMENT  AM-PAC ‘6-Clicks’ Inpatient Daily Activity Short Form  How much help from another person does the patient currently need…  -   Putting on and taking off regular lower body clothing?: A Lot  -   Bathing (including washing, rinsing, drying)?: A Lot  -   Toileting, which includes using toilet, bedpan or urinal? : A Lot  -   Putting on and taking off regular upper body clothing?: A Little  -   Taking care of personal grooming such as brushing  teeth?: A Little  -   Eating meals?: A Little    AM-PAC Score:  Score: 15  Approx Degree of Impairment: 56.46%  Standardized Score (AM-PAC Scale): 34.69  CMS Modifier (G-Code): CK       Skilled Therapy Provided: Patient up and ambulatory with Min A to bathroom; found to be incontinent of bladder/leaking purwick; Max A for LE dressing to doff and don new clothing; Max  A for shoes/socks; Patient completed toileting tasks with Mod A  for clothing management; able to complete hygiene/pericare with setup; pt completed toilet t/f with Min A; ambulatory back to bedside chair with Min A; VSS throughout; stable at exit.    EDUCATION PROVIDED  Patient: Role of Occupational Therapy; Plan of Care; Discharge Recommendations; Functional Transfer Techniques; Fall Prevention; Surgical Precautions; Posture/Positioning; Compensatory ADL Techniques  Patient's Response to Education: Verbalized Understanding    The patient's Approx Degree of Impairment: 56.46% has been calculated based on documentation in the [facility] '6 clicks' Inpatient Daily Activity Short Form.  Research supports that patients with this level of impairment may benefit from GR.  Final disposition will be made by interdisciplinary medical team.    Patient End of Session: Up in chair    OT Goals:      OT Goals  Patients self stated goal is: no goals stated      Patient will complete functional transfer with SBA   Comment: Min A    Patient will complete toileting with SBA   Comment: Mod A     Patient will tolerate standing for 3-5 minutes in prep for adls with SBA   Comment:progressing     Comment:          Goals  on:    Frequency: 3-5x week    OT Session Time: 25 minutes  Self-Care Home Management: 25 minutes

## 2024-07-05 NOTE — PHYSICAL THERAPY NOTE
PHYSICAL THERAPY TREATMENT NOTE - INPATIENT     Room Number: 304/304-A       Presenting Problem: shortness of breath, LE swelling       Problem List  Principal Problem:    Shortness of breath  Active Problems:    Acute exacerbation of CHF (congestive heart failure) (HCC)    Leg swelling    Hypoxia      PHYSICAL THERAPY ASSESSMENT   Patient demonstrates good  progress this session, goals  remain in progress.    Patient continues to function below baseline with bed mobility, transfers, gait, standing prolonged periods, and performing household tasks.  Contributing factors to remaining limitations include decreased functional strength, decreased endurance/aerobic capacity, pain, impaired standing balance, decreased muscular endurance, medical status, and increased O2 needs from baseline.  Next session anticipate patient to progress bed mobility, transfers, and gait.  Physical Therapy will continue to follow patient for duration of hospitalization.    Patient continues to benefit from continued skilled PT services: to promote return to prior level of function and safety with continuous assistance and gradual rehabilitative therapy .    PLAN  PT Treatment Plan: Bed mobility;Body mechanics;Coordination;Endurance;Energy conservation;Patient education;Gait training;Strengthening;Transfer training;Balance training  Frequency (Obs): 3-5x/week    SUBJECTIVE  \"I am feeling better.\"    OBJECTIVE  Precautions: Bed/chair alarm    PAIN ASSESSMENT   Rating: Unable to rate  Location: neck  Management Techniques: Activity promotion;Body mechanics;Repositioning    BALANCE  Static Sitting: Fair +  Dynamic Sitting: Fair  Static Standing: Fair -  Dynamic Standing: Poor +    ACTIVITY TOLERANCE  Pulse: 58  Heart Rate Source: Monitor     BP: 123/49  BP Location: Right arm  BP Method: Automatic  Patient Position: Sitting     O2 WALK  Oxygen Therapy  SPO2% on Oxygen at Rest: 94  At rest oxygen flow (liters per minute): 4    AM-PAC '6-Clicks'  INPATIENT SHORT FORM - BASIC MOBILITY  How much difficulty does the patient currently have...  Patient Difficulty: Turning over in bed (including adjusting bedclothes, sheets and blankets)?: A Little   Patient Difficulty: Sitting down on and standing up from a chair with arms (e.g., wheelchair, bedside commode, etc.): A Little   Patient Difficulty: Moving from lying on back to sitting on the side of the bed?: A Little   How much help from another person does the patient currently need...   Help from Another: Moving to and from a bed to a chair (including a wheelchair)?: A Little   Help from Another: Need to walk in hospital room?: A Little   Help from Another: Climbing 3-5 steps with a railing?: A Lot     AM-PAC Score:  Raw Score: 17   Approx Degree of Impairment: 50.57%   Standardized Score (AM-PAC Scale): 42.13   CMS Modifier (G-Code): CK    FUNCTIONAL ABILITY STATUS  Functional Mobility/Gait Assessment  Gait Assistance: Minimum assistance  Distance (ft): 25 ft x 2  Assistive Device: Rolling walker  Pattern: Shuffle (decreased shobha speed, decreased step length, flexed posture)  Sit to Stand: minimal assist from bedside chair and toilet x 2 trials    Additional information: Patient tolerated increased activity and ambulation distance this session. Patient continues to benefit from increased time, rest breaks, and cues in order to complete functional mobility tasks. Cues provided for patient to keep RW within proximity during transfers. Assisted patient to/from bathroom with Min A.     The patient's Approx Degree of Impairment: 50.57% has been calculated based on documentation in the [facility] '6 clicks' Inpatient Daily Activity Short Form.  Research supports that patients with this level of impairment may benefit from rehab.  Final disposition will be made by interdisciplinary medical team.    Patient sitting in bedside chair with visitor present in room upon arrival. RN approved activity. Educated patient on POC  and benefits of mobilization. Agreeable to participate. Patient reporting neck pain, not quantified per the pain scale.  Patient End of Session: Up in chair;Needs met;Call light within reach;RN aware of session/findings;All patient questions and concerns addressed;Family present    CURRENT GOALS   Goals to be met by: 7/16/24  Patient Goal Patient's self-stated goal is: none stated   Goal #1 Patient is able to demonstrate supine - sit EOB @ level: minimum assistance      Goal #1   Current Status  NT   Goal #2 Patient is able to demonstrate transfers Sit to/from Stand at assistance level: SBA with walker - rolling      Goal #2  Current Status  Goal met and updated 7/5/24: Min A with RW   Goal #3 Patient is able to ambulate 30 feet with assist device: walker - rolling at assistance level: SBA   Goal #3   Current Status  Goal partially met (assistance level) and updated: Min A 25 ft x 2 with RW   Goal #4 Patient to demonstrate independence with home activity/exercise instructions provided to patient in preparation for discharge.   Goal #4   Current Status  Ongoing     Gait Training: 10 minutes  Therapeutic Activity: 15 minutes

## 2024-07-06 VITALS
DIASTOLIC BLOOD PRESSURE: 69 MMHG | TEMPERATURE: 98 F | HEIGHT: 64.02 IN | OXYGEN SATURATION: 94 % | RESPIRATION RATE: 18 BRPM | WEIGHT: 156.19 LBS | HEART RATE: 65 BPM | BODY MASS INDEX: 26.67 KG/M2 | SYSTOLIC BLOOD PRESSURE: 152 MMHG

## 2024-07-06 LAB
ANION GAP SERPL CALC-SCNC: 2 MMOL/L (ref 0–18)
BASOPHILS # BLD AUTO: 0.06 X10(3) UL (ref 0–0.2)
BASOPHILS NFR BLD AUTO: 0.8 %
BUN BLD-MCNC: 34 MG/DL (ref 9–23)
BUN/CREAT SERPL: 31.2 (ref 10–20)
CALCIUM BLD-MCNC: 9.4 MG/DL (ref 8.7–10.4)
CHLORIDE SERPL-SCNC: 95 MMOL/L (ref 98–112)
CO2 SERPL-SCNC: 35 MMOL/L (ref 21–32)
CREAT BLD-MCNC: 1.09 MG/DL
DEPRECATED RDW RBC AUTO: 50.8 FL (ref 35.1–46.3)
EGFRCR SERPLBLD CKD-EPI 2021: 46 ML/MIN/1.73M2 (ref 60–?)
EOSINOPHIL # BLD AUTO: 0.46 X10(3) UL (ref 0–0.7)
EOSINOPHIL NFR BLD AUTO: 6.1 %
ERYTHROCYTE [DISTWIDTH] IN BLOOD BY AUTOMATED COUNT: 15.6 % (ref 11–15)
GLUCOSE BLD-MCNC: 105 MG/DL (ref 70–99)
HCT VFR BLD AUTO: 34.5 %
HGB BLD-MCNC: 11.1 G/DL
IMM GRANULOCYTES # BLD AUTO: 0.02 X10(3) UL (ref 0–1)
IMM GRANULOCYTES NFR BLD: 0.3 %
LYMPHOCYTES # BLD AUTO: 1.72 X10(3) UL (ref 1–4)
LYMPHOCYTES NFR BLD AUTO: 22.7 %
MAGNESIUM SERPL-MCNC: 2.3 MG/DL (ref 1.6–2.6)
MCH RBC QN AUTO: 28.5 PG (ref 26–34)
MCHC RBC AUTO-ENTMCNC: 32.2 G/DL (ref 31–37)
MCV RBC AUTO: 88.5 FL
MONOCYTES # BLD AUTO: 0.9 X10(3) UL (ref 0.1–1)
MONOCYTES NFR BLD AUTO: 11.9 %
NEUTROPHILS # BLD AUTO: 4.41 X10 (3) UL (ref 1.5–7.7)
NEUTROPHILS # BLD AUTO: 4.41 X10(3) UL (ref 1.5–7.7)
NEUTROPHILS NFR BLD AUTO: 58.2 %
OSMOLALITY SERPL CALC.SUM OF ELEC: 282 MOSM/KG (ref 275–295)
PLATELET # BLD AUTO: 282 10(3)UL (ref 150–450)
POTASSIUM SERPL-SCNC: 4.5 MMOL/L (ref 3.5–5.1)
RBC # BLD AUTO: 3.9 X10(6)UL
SODIUM SERPL-SCNC: 132 MMOL/L (ref 136–145)
WBC # BLD AUTO: 7.6 X10(3) UL (ref 4–11)

## 2024-07-06 PROCEDURE — 80048 BASIC METABOLIC PNL TOTAL CA: CPT | Performed by: INTERNAL MEDICINE

## 2024-07-06 PROCEDURE — 83735 ASSAY OF MAGNESIUM: CPT | Performed by: INTERNAL MEDICINE

## 2024-07-06 PROCEDURE — 85025 COMPLETE CBC W/AUTO DIFF WBC: CPT | Performed by: INTERNAL MEDICINE

## 2024-07-06 RX ORDER — AMLODIPINE BESYLATE 5 MG/1
5 TABLET ORAL ONCE
Status: COMPLETED | OUTPATIENT
Start: 2024-07-06 | End: 2024-07-06

## 2024-07-06 RX ORDER — TRAMADOL HYDROCHLORIDE 50 MG/1
25 TABLET ORAL EVERY 6 HOURS PRN
Qty: 10 TABLET | Refills: 0 | Status: SHIPPED | OUTPATIENT
Start: 2024-07-06

## 2024-07-06 RX ORDER — FUROSEMIDE 20 MG/1
TABLET ORAL
Qty: 45 TABLET | Refills: 1 | Status: SHIPPED | OUTPATIENT
Start: 2024-07-06

## 2024-07-06 NOTE — PLAN OF CARE
Pt. A/Ox4, on 3L, VSS. Tramadol for neck pain. No acute events. Pt educated on call light use, within reach. Safety measures in place.     Problem: Patient Centered Care  Goal: Patient preferences are identified and integrated in the patient's plan of care  Description: Interventions:  - What would you like us to know as we care for you? I'm from home alone with caregiver   - Provide timely, complete, and accurate information to patient/family  - Incorporate patient and family knowledge, values, beliefs, and cultural backgrounds into the planning and delivery of care  - Encourage patient/family to participate in care and decision-making at the level they choose  - Honor patient and family perspectives and choices  Outcome: Progressing     Problem: Patient/Family Goals  Goal: Patient/Family Long Term Goal  Description: Patient's Long Term Goal: to go back home    Interventions:  - monitor vitals, labs, imaging  - diurese  - oxygen therapy  - follow medical regimen  - See additional Care Plan goals for specific interventions  Outcome: Progressing  Goal: Patient/Family Short Term Goal  Description: Patient's Short Term Goal: To not be SOB    Interventions:   - Follow medical regimen  -IV lasix  -cards consult  - See additional Care Plan goals for specific interventions  Outcome: Progressing     Problem: SAFETY ADULT - FALL  Goal: Free from fall injury  Description: INTERVENTIONS:  - Assess pt frequently for physical needs  - Identify cognitive and physical deficits and behaviors that affect risk of falls.  - [place] fall precautions as indicated by assessment.  - Educate pt/family on patient safety including physical limitations  - Instruct pt to call for assistance with activity based on assessment  - Modify environment to reduce risk of injury  - Provide assistive devices as appropriate  - Consider OT/PT consult to assist with strengthening/mobility  - Encourage toileting schedule  Outcome: Progressing     Problem:  RESPIRATORY - ADULT  Goal: Achieves optimal ventilation and oxygenation  Description: INTERVENTIONS:  - Assess for changes in respiratory status  - Assess for changes in mentation and behavior  - Position to facilitate oxygenation and minimize respiratory effort  - Oxygen supplementation based on oxygen saturation or ABGs  - Provide Smoking Cessation handout, if applicable  - Encourage broncho-pulmonary hygiene including cough, deep breathe, Incentive Spirometry  - Assess the need for suctioning and perform as needed  - Assess and instruct to report SOB or any respiratory difficulty  - Respiratory Therapy support as indicated  - Manage/alleviate anxiety  - Monitor for signs/symptoms of CO2 retention  Outcome: Progressing     Problem: CARDIOVASCULAR - ADULT  Goal: Maintains optimal cardiac output and hemodynamic stability  Description: INTERVENTIONS:  - Monitor vital signs, rhythm, and trends  - Monitor for bleeding, hypotension and signs of decreased cardiac output  - Evaluate effectiveness of vasoactive medications to optimize hemodynamic stability  - Monitor arterial and/or venous puncture sites for bleeding and/or hematoma  - Assess quality of pulses, skin color and temperature  - Assess for signs of decreased coronary artery perfusion - ex. Angina  - Evaluate fluid balance, assess for edema, trend weights  Outcome: Progressing  Goal: Absence of cardiac arrhythmias or at baseline  Description: INTERVENTIONS:  - Continuous cardiac monitoring, monitor vital signs, obtain 12 lead EKG if indicated  - Evaluate effectiveness of antiarrhythmic and heart rate control medications as ordered  - Initiate emergency measures for life threatening arrhythmias  - Monitor electrolytes and administer replacement therapy as ordered  Outcome: Progressing     Problem: HEMATOLOGIC - ADULT  Goal: Maintains hematologic stability  Description: INTERVENTIONS  - Assess for signs and symptoms of bleeding or hemorrhage  - Monitor labs and  vital signs for trends  - Administer supportive blood products/factors, fluids and medications as ordered and appropriate  - Administer supportive blood products/factors as ordered and appropriate  Outcome: Progressing  Goal: Free from bleeding injury  Description: (Example usage: patient with low platelets)  INTERVENTIONS:  - Avoid intramuscular injections, enemas and rectal medication administration  - Ensure safe mobilization of patient  - Hold pressure on venipuncture sites to achieve adequate hemostasis  - Assess for signs and symptoms of internal bleeding  - Monitor lab trends  - Patient is to report abnormal signs of bleeding to staff  - Avoid use of toothpicks and dental floss  - Use electric shaver for shaving  - Use soft bristle tooth brush  - Limit straining and forceful nose blowing  Outcome: Progressing

## 2024-07-06 NOTE — PROGRESS NOTES
[facility]  part of [facility]    Cardiology Progress Note    Sharyn Mayen Patient Status:  Inpatient    1928 MRN [id number]   Location [facility] 3W/SW Attending Sofia Cuenca MD   Hosp Day # 5 PCP Jose E Varghese MD       Impression/Plan:  89 year old female presenting with:     Dyspnea, probably a component of CHF on top of chronic right pleural effusion (exudative in the past)  Acute on chronic diastolic CHF (EF 60% this admission)  Atrial tachycardia (slow atrial flutter: atrial rate 150 on admission)  Small \"penetrating ulcers\" noted descending aorta on CT chest, asymptomatic, unclear significance  HTN, controlled  HL, on statin  History of CVA  Recent herpes zoster     - Feeling close to baseline and feels improved. Will switch to PO lasix 20mg and 40mg every other day.   - Wean O2 as able  - ASA/plavix vs DOAC. I am ok with whatever route patient/family wants to go. Her atrial \"flutter\" was really in the atrial tachycardia range. Will have her follow up with cardiology.  - Pleural effusion as per pulm; have chosen not to tap again  - Monitor on tele             Subjective:     No chest pain or dyspnea. Feels \"much better.\" Has some LE edema bilat, but this is not new for her. She states that her legs feel close to baseline.          Patient Active Problem List   Diagnosis    Essential hypertension, malignant    Impingement syndrome of shoulder    Elevated cholesterol    Pulmonary HTN (HCC)    Primary localized osteoarthrosis, lower leg    Degenerative disc disease, lumbar    Aortic stenosis, mild    CHF (congestive heart failure), NYHA class I, chronic, diastolic (HCC)    Hair loss    Benign hypertension with CKD (chronic kidney disease) stage III (HCC)    Stress incontinence in female    Ischemic colitis (HCC)    Adnexal cyst    Ocular migraine with status migrainosus    Cough due to ACE inhibitor    Vitamin D deficiency    Bilateral primary osteoarthritis of  knee    Recurrent major depressive disorder, in partial remission (HCC)    DNR (do not resuscitate)    Tachycardia-bradycardia syndrome (HCC)    Acute exacerbation of CHF (congestive heart failure) ([facility])    Shortness of breath    Leg swelling    Hypoxia       Objective:   Temp: 97.9 °F (36.6 °C)  Pulse: 55  Resp: 18  BP: 137/54    Intake/Output:     Intake/Output Summary (Last 24 hours) at 7/6/2024 1357  Last data filed at 7/6/2024 1051  Gross per 24 hour   Intake 530 ml   Output 1050 ml   Net -520 ml       Last 3 Weights   07/06/24 0623 156 lb 3.2 oz (70.9 kg)   07/05/24 0539 155 lb (70.3 kg)   07/02/24 0555 153 lb 8 oz (69.6 kg)   07/01/24 1030 152 lb 8 oz (69.2 kg)   03/14/22 1521 181 lb (82.1 kg)   09/24/21 1123 177 lb (80.3 kg)       Tele: NSR, PAC, PVC    Physical Exam:    General: Alert and oriented x 3. No apparent distress. No respiratory or constitutional distress.  HEENT: Normocephalic, anicteric sclera  Neck: No JVD  Cardiac: Regular rate and rhythm. S1, S2 normal. No murmur, pericardial rub, S3, thrill, heave or extra cardiac sounds.  Lungs: Clear without wheezes, rales, rhonchi or dullness.  Normal excursions and effort.  Abdomen: Soft, nondistended  Extremities: Without clubbing, cyanosis or edema.    Neurologic: Alert and oriented, normal affect. No motor or coordinational deficit.  Skin: Warm and dry.     Laboratory/Data:    Labs:         Recent Labs   Lab 07/02/24  0633 07/03/24  0641 07/04/24  0717 07/05/24  0656 07/06/24  0725   WBC 9.3 9.0 10.0 8.6 7.6   HGB 11.0* 10.7* 11.1* 10.8* 11.1*   MCV 86.8 87.1 89.5 90.3 88.5   .0 252.0 277.0 278.0 282.0       Recent Labs   Lab 07/02/24  0633 07/03/24  0641 07/04/24  0717 07/05/24  0656 07/06/24  0736   *  134* 134* 136 135* 132*   K 4.0  4.0 3.8 4.3  4.3 4.0 4.5   CL 97*  97* 97* 98 97* 95*   CO2 29.0  29.0 28.0 32.0 33.0* 35.0*   BUN 27*  27* 31* 33* 32* 34*   CREATSERUM 1.14*  1.14* 1.09* 1.04* 0.91 1.09*   CA 9.1  9.1 9.0 9.0  9.1 9.4   MG 1.9 2.0 2.2 2.1 2.3   *  107* 106* 135* 105* 105*       Recent Labs   Lab 07/02/24  0633   ALT 7*   AST 16   ALB 3.8       No results for input(s): \"TROP\" in the last 168 hours.    ECHO:  1. Left ventricle: The cavity size was normal. Wall thickness was mildly      increased. Systolic function was normal. The estimated ejection fraction      was 60%, by visual assessment. Wall motion is normal; there are no      regional wall motion abnormalities. Unable to assess LV diastolic      function due to heart rhythm.   2. Left atrium: The atrium was markedly dilated.   3. Right atrium: The atrium was moderately dilated. The estimated central      venous pressure is 15mm Hg.   4. Pulmonary arteries: Systolic pressure was moderately increased, estimated      to be 63mm Hg.   *       Allergies:   Allergies   Allergen Reactions    Aceinhibitors [Ace Inhibitors] Coughing       Medications:  Current Facility-Administered Medications   Medication Dose Route Frequency    apixaban (Eliquis) tab 2.5 mg  2.5 mg Oral BID    traMADol (Ultram) tab 25 mg  25 mg Oral Q6H PRN    lidocaine-menthol 4-1 % patch 2 patch  2 patch Transdermal Daily PRN    gabapentin (Neurontin) cap 200 mg  200 mg Oral Nightly    gabapentin (Neurontin) cap 100 mg  100 mg Oral BID    camphor/menthol/methyl salicylate (Thera-Gesic) 10-15 % cream   Topical TID PRN    alum-mag hydroxide-simethicone (Maalox) 200-200-20 MG/5ML oral suspension 30 mL  30 mL Oral QID PRN    acetaminophen (Tylenol Extra Strength) tab 500 mg  500 mg Oral Q4H PRN    [Held by provider] amLODIPine (Norvasc) tab 5 mg  5 mg Oral BID    escitalopram (Lexapro) tab 10 mg  10 mg Oral Daily    latanoprost (Xalatan) 0.005 % ophthalmic solution 1 drop  1 drop Both Eyes Nightly    metoprolol succinate ER (Toprol XL) 24 hr tab 50 mg  50 mg Oral Daily    pantoprazole (Protonix) DR tab 20 mg  20 mg Oral QAM AC    melatonin tab 3 mg  3 mg Oral Nightly PRN    polyethylene glycol (PEG  3350) (Miralax) 17 g oral packet 17 g  17 g Oral Daily PRN    sennosides (Senokot) tab 17.2 mg  17.2 mg Oral Nightly PRN    bisacodyl (Dulcolax) 10 MG rectal suppository 10 mg  10 mg Rectal Daily PRN    fleet enema (Fleet) 7-19 GM/118ML rectal enema 133 mL  1 enema Rectal Once PRN    ondansetron (Zofran) 4 MG/2ML injection 4 mg  4 mg Intravenous Q6H PRN    metoclopramide (Reglan) 5 mg/mL injection 10 mg  10 mg Intravenous Q8H PRN    [Held by provider] furosemide (Lasix) 10 mg/mL injection 40 mg  40 mg Intravenous BID (Diuretic)    docusate sodium (Colace) cap 100 mg  100 mg Oral BID

## 2024-07-06 NOTE — PLAN OF CARE
Pt alert and oriented x4. X1 assist with rolling walker. Weaned to 2L NC. PRN Tramadol given for pain. Pt and family updated on plan of care. Plan to discharge when medically cleared. Safety precautions in place. Call light within reach.    Problem: Patient Centered Care  Goal: Patient preferences are identified and integrated in the patient's plan of care  Description: Interventions:  - What would you like us to know as we care for you? I'm from home alone with caregiver   - Provide timely, complete, and accurate information to patient/family  - Incorporate patient and family knowledge, values, beliefs, and cultural backgrounds into the planning and delivery of care  - Encourage patient/family to participate in care and decision-making at the level they choose  - Honor patient and family perspectives and choices  Outcome: Progressing     Problem: Patient/Family Goals  Goal: Patient/Family Long Term Goal  Description: Patient's Long Term Goal: to go back home    Interventions:  - monitor vitals, labs, imaging  - diurese  - oxygen therapy  - follow medical regimen  - See additional Care Plan goals for specific interventions  Outcome: Progressing  Goal: Patient/Family Short Term Goal  Description: Patient's Short Term Goal: To not be SOB    Interventions:   - Follow medical regimen  -IV lasix  -cards consult  - See additional Care Plan goals for specific interventions  Outcome: Progressing     Problem: SAFETY ADULT - FALL  Goal: Free from fall injury  Description: INTERVENTIONS:  - Assess pt frequently for physical needs  - Identify cognitive and physical deficits and behaviors that affect risk of falls.  - [place] fall precautions as indicated by assessment.  - Educate pt/family on patient safety including physical limitations  - Instruct pt to call for assistance with activity based on assessment  - Modify environment to reduce risk of injury  - Provide assistive devices as appropriate  - Consider OT/PT consult to  assist with strengthening/mobility  - Encourage toileting schedule  Outcome: Progressing     Problem: RESPIRATORY - ADULT  Goal: Achieves optimal ventilation and oxygenation  Description: INTERVENTIONS:  - Assess for changes in respiratory status  - Assess for changes in mentation and behavior  - Position to facilitate oxygenation and minimize respiratory effort  - Oxygen supplementation based on oxygen saturation or ABGs  - Provide Smoking Cessation handout, if applicable  - Encourage broncho-pulmonary hygiene including cough, deep breathe, Incentive Spirometry  - Assess the need for suctioning and perform as needed  - Assess and instruct to report SOB or any respiratory difficulty  - Respiratory Therapy support as indicated  - Manage/alleviate anxiety  - Monitor for signs/symptoms of CO2 retention  Outcome: Progressing     Problem: CARDIOVASCULAR - ADULT  Goal: Maintains optimal cardiac output and hemodynamic stability  Description: INTERVENTIONS:  - Monitor vital signs, rhythm, and trends  - Monitor for bleeding, hypotension and signs of decreased cardiac output  - Evaluate effectiveness of vasoactive medications to optimize hemodynamic stability  - Monitor arterial and/or venous puncture sites for bleeding and/or hematoma  - Assess quality of pulses, skin color and temperature  - Assess for signs of decreased coronary artery perfusion - ex. Angina  - Evaluate fluid balance, assess for edema, trend weights  Outcome: Progressing  Goal: Absence of cardiac arrhythmias or at baseline  Description: INTERVENTIONS:  - Continuous cardiac monitoring, monitor vital signs, obtain 12 lead EKG if indicated  - Evaluate effectiveness of antiarrhythmic and heart rate control medications as ordered  - Initiate emergency measures for life threatening arrhythmias  - Monitor electrolytes and administer replacement therapy as ordered  Outcome: Progressing     Problem: HEMATOLOGIC - ADULT  Goal: Maintains hematologic  stability  Description: INTERVENTIONS  - Assess for signs and symptoms of bleeding or hemorrhage  - Monitor labs and vital signs for trends  - Administer supportive blood products/factors, fluids and medications as ordered and appropriate  - Administer supportive blood products/factors as ordered and appropriate  Outcome: Progressing  Goal: Free from bleeding injury  Description: (Example usage: patient with low platelets)  INTERVENTIONS:  - Avoid intramuscular injections, enemas and rectal medication administration  - Ensure safe mobilization of patient  - Hold pressure on venipuncture sites to achieve adequate hemostasis  - Assess for signs and symptoms of internal bleeding  - Monitor lab trends  - Patient is to report abnormal signs of bleeding to staff  - Avoid use of toothpicks and dental floss  - Use electric shaver for shaving  - Use soft bristle tooth brush  - Limit straining and forceful nose blowing  Outcome: Progressing

## 2024-07-06 NOTE — CM/SW NOTE
07/06/24 1400   Discharge disposition   Expected discharge disposition subacute   Post Acute Care Provider [facility]   Additional Home Care/Hospice Provider   ([facility] )   Discharge transportation [facility]     GUERDA confirmed with TAYLOR Rodríguez that pt is medically ready for discharge today. GUERDA discussed with liaison Palak from [place]  to arrange a time for discharge.  GUERDA scheduled [facility] for 5:00 PM transport.  RN is aware of discharge time and location and will inform patient/ family. RN to attach IP Transfer Report to After Visit Summary packet for transfer to [facility].  GUERDA confirmed PASRR screen was completed.     PLAN: DC to [facility] [place] via [facility] at 5:00 PM.  PCS competed.    RN # to report: [phone]     Milagros Pandey MSW, LSW [id number]

## 2024-07-06 NOTE — DISCHARGE SUMMARY
General Medicine Discharge Summary     Patient ID:  Sharyn Mayen  89 year old  4/29/1928    Admit date: 7/1/2024    Discharge date and time: 07/06/24    Attending Physician: Kim Waterman DO     Consults: IP CONSULT TO CARDIOLOGY  IP CONSULT TO PULMONOLOGY  IP CONSULT TO SOCIAL WORK  IP CONSULT TO SOCIAL WORK  IP CONSULT TO VASCULAR ACCESS TEAM    Primary Care Physician: Jose E Varghese MD     Reason for admission: hypoxemia, HF exacerbation    Risk of Readmission Lace+ Score: 70  59-90 High Risk  29-58 Medium Risk  0-28   Low Risk    Discharge Diagnoses: Shortness of breath [R06.02]  Leg swelling [M79.89]  Hypoxia [R09.02]  See Additional Discharge Diagnoses in Hospital Course    Discharged Condition: good    Follow-up with labs/images appointments:   F/u with PCP    Exam  Gen: No acute distress  Pulm: Lungs clear, normal respiratory effort  CV: Heart with regular rate and rhythm  Abd: Abdomen soft,   EXT: no edema     HPI: Ms. Mayen is a 89 yo F with PMH of HTN, HL, chronic diastolic CHF, recurrent R pleural effusion, CKD3 who presented with shortness of breath. Patient states symptoms worsened yesterday. State she has had history of recurrent shortness of breath, noticed symptoms for a few days but really bad yesterday morning. Has had thoracentesis for pleural effusions in the past. Does take a diuretic at home, has been compliant. Lives alone, has a caregiver a few days a week.        Hospital Course:   Ms. Mayen is a 89 yo F with PMH of HTN, HL, chronic diastolic CHF, recurrent R pleural effusion, CKD3 who presented with shortness of breath. Admitted with hypoxemia and found to have HF. Imaging showed chronic pleural effusion, pulm saw with no plans for thoracentesis. Also seen by cardiology for IV diuresis. O2 improved but still discharged on 2L NC. Also switched to DOAC over DAPT     Acute hypoxic respiratory failure, on 4L now  Acute on chronic diastolic CHF  Recurrent R pleural effusion  -  continue IV lasix 40 mg BID, monitor renal function  - cardiology consulted  - TTE with EF 60%, unable to assess diastolic dysfunction, elevated PASP  - CXR with R pleural effusion  - pulm consulted, no need for thoracentesis at this point, trapped lung, patient also declining thoracentesis     Abnormal EKG  Slow Aflutter  - cardiology consult,  - monitor on tele  - discussed DOAC with family and cardiology, to stop DAPT, family in agreement, will transition to reduced dose eliquis 2.5 mg BID given fall risk     Hx CVA base on MRI  - patient/family denies hx of CVA, prior MRI 2017 shows chronic infarct  - ASA/plavix stopped     HTN  - BP stable     HL  - statin     Hx Macular degeneration    Operative Procedures:      Imaging: No results found.      Disposition: SNF    Activity: activity as tolerated  Diet: cardiac diet  Wound Care: as directed  Code Status: DNAR/Comfort Care  O2: 2-3L    Home Medication Changes:     Med list     Medication List        START taking these medications      apixaban 2.5 MG Tabs  Commonly known as: Eliquis  Take 1 tablet (2.5 mg total) by mouth 2 (two) times daily.     traMADol 50 MG Tabs  Commonly known as: Ultram  Take 0.5 tablets (25 mg total) by mouth every 6 (six) hours as needed.            CHANGE how you take these medications      furosemide 20 MG Tabs  Commonly known as: Lasix  Take 20 mg and 40 mg on alternating days  What changed:   how much to take  how to take this  when to take this  additional instructions            CONTINUE taking these medications      atorvastatin 20 MG Tabs  Commonly known as: Lipitor  TAKE 1 TABLET BY MOUTH DAILY AT BEDTIME     escitalopram 10 MG Tabs  Commonly known as: Lexapro  TAKE 1 TABLET BY MOUTH ONCE A DAY     hydrocortisone 2.5 % Crea  APPLY TO AFFECTED AREA TWICE DAILY AS NEEDED     latanoprost 0.005 % Soln  Commonly known as: Xalatan     M-VIT OR     metoprolol succinate ER 50 MG Tb24  Commonly known as: Toprol XL  TAKE 1 TABLET BY MOUTH  DAILY     omeprazole 20 MG Cpdr  Commonly known as: PriLOSEC  TAKE 1 CAPSULE BY MOUTH EVERY MORNING     PreserVision AREDS 2 Caps     spironolactone 25 MG Tabs  Commonly known as: Aldactone            STOP taking these medications      amLODIPine 5 MG Tabs  Commonly known as: Norvasc     aspirin 81 MG Chew     clopidogrel 75 MG Tabs  Commonly known as: Plavix               Where to Get Your Medications        These medications were sent to [facility] [facility], [facility] - [place], [place] - [address] [phone], [phone]  [address], [place] [address]      Phone: [phone]   furosemide 20 MG Tabs       You can get these medications from any pharmacy    Bring a paper prescription for each of these medications  apixaban 2.5 MG Tabs  traMADol 50 MG Tabs         FU   Follow-up Information       Jose E Varghese MD Follow up.    Specialties: Internal Medicine, PEDIATRICS  Contact information:  [address]  [address]  [place] [address] [phone]                             DC instructions:      Other Discharge Instructions:         Going Home Instructions  In this section you will find the tools which will guide you through the first few days after you leave the hospital. Continued use of these tools will help you develop the skills necessary to keep your heart failure under control.     Home Care Instructions Following Heart Failure - the most important things to do every day include:   Weigh yourself and review the “Self-Check Plan” sheet every morning.   Call your cardiologist office if you are in the “Pay Attention-Use Caution” (yellow zone) or “Medical Alert-Warning!” (red zone) as outlined in the Self-Check Plan sheet.  Take your medicines as prescribed.  Limit your sodium (salt) intake.  Know when to call your cardiologist, primary doctor, or nurse.  Know when to seek emergency care.      Things for You to Remember:   1. See your doctor or healthcare provider as written on your discharge  instructions.  It is important that you attend this appointment to make sure your symptoms are under control.     2. Your recommended sodium intake is 6399-1016 mg daily.    3.  Weigh yourself every day.    4. Some exercise and activity is important to help keep your heart functioning and strong. Unless instructed not to exercise, you may walk at a slow to moderate pace for 10-15 minutes 2-3 days per week to start. Pace your activity to prevent shortness of breath or fatigue. Stop exercising if you develop chest pain, lightheadedness, or significant shortness of breath.       Call Your Cardiologist If:   You gain 2-3 pounds in one day or 5 pounds in one week.  You have more difficulty breathing.  You are getting more tired with normal activity.  You are more short of breath lying down, or awaken at night short of breath.  You have swelling of your feet or legs.  You urinate less often during the day and more often at night.  You have cramps in your legs.  You have blurred vision or see yellowish-green halos around objects of lights.    Go to the Emergency Room If:   You have pain or tightness in your chest  You are extremely short of breath  You are coughing up pink-frothy mucus  You are traveling and develop symptoms of worsening heart failure      ** Please follow up with your cardiologist or Advanced Practice Provider as written on your discharge instructions. If you are not provided with an appointment, let your nurse know so you can get an appointment**     [facility]:  [facility]  [address] [place] [id number]  Phone: [phone]  Fax: [phone]          I reconciled current and discharge medications on day of discharge, discussed changes with patient and noted changes above.       Total Time Coordinating Care: Greater than 30 minutes    Patient had opportunity to ask questions and state understand and agree with therapeutic plan as outlined    Thank YouKim  DO Guevara   Hospitalist with [facility]

## 2024-07-06 NOTE — DISCHARGE PLANNING
Pt ready and cleared for discharge. IV and tele removed. Pt education and paperwork provided to family and EMS. Report given to Maya VAZQUEZ at [facility] [place]. Pt discharging with [facility] [place]. Pt BP elevated at MD gareth paged. One time dose of pt home med of amlodipine 5mg given per Dr. Waterman.

## 2024-08-08 ENCOUNTER — HOSPITAL ENCOUNTER (EMERGENCY)
Age: 89
Discharge: HOME | End: 2024-08-08
Attending: EMERGENCY MEDICINE

## 2024-08-08 VITALS
RESPIRATION RATE: 16 BRPM | OXYGEN SATURATION: 94 % | SYSTOLIC BLOOD PRESSURE: 162 MMHG | DIASTOLIC BLOOD PRESSURE: 64 MMHG | HEART RATE: 78 BPM

## 2024-08-08 DIAGNOSIS — I10 HYPERTENSION, UNSPECIFIED TYPE: Primary | ICD-10-CM

## 2024-08-08 PROCEDURE — 10002803 HB RX 637: Performed by: EMERGENCY MEDICINE

## 2024-08-08 PROCEDURE — 99284 EMERGENCY DEPT VISIT MOD MDM: CPT

## 2024-08-08 RX ORDER — TRAMADOL HYDROCHLORIDE 50 MG/1
25 TABLET ORAL
COMMUNITY
Start: 2024-07-06

## 2024-08-08 RX ORDER — FAMCICLOVIR 500 MG/1
TABLET ORAL
COMMUNITY
Start: 2024-06-12

## 2024-08-08 RX ORDER — LATANOPROST 50 UG/ML
SOLUTION/ DROPS OPHTHALMIC
COMMUNITY
Start: 2024-04-25

## 2024-08-08 RX ORDER — ONDANSETRON 4 MG/1
4 TABLET, FILM COATED ORAL EVERY 8 HOURS PRN
COMMUNITY
Start: 2024-07-12

## 2024-08-08 RX ORDER — IPRATROPIUM BROMIDE 42 UG/1
1 SPRAY, METERED NASAL
COMMUNITY
Start: 2024-07-23

## 2024-08-08 RX ORDER — HYDRALAZINE HYDROCHLORIDE 25 MG/1
25 TABLET, FILM COATED ORAL EVERY 12 HOURS
COMMUNITY
Start: 2017-02-08

## 2024-08-08 RX ORDER — ATORVASTATIN CALCIUM 20 MG/1
1 TABLET, FILM COATED ORAL NIGHTLY
COMMUNITY
Start: 2024-04-25

## 2024-08-08 RX ORDER — GABAPENTIN 100 MG/1
CAPSULE ORAL
COMMUNITY

## 2024-08-08 RX ORDER — HYDROCHLOROTHIAZIDE 12.5 MG/1
12.5 TABLET ORAL
COMMUNITY
Start: 2017-02-01

## 2024-08-08 RX ORDER — AMLODIPINE BESYLATE 5 MG/1
5 TABLET ORAL ONCE
Status: COMPLETED | OUTPATIENT
Start: 2024-08-08 | End: 2024-08-08

## 2024-08-08 RX ORDER — LORAZEPAM 0.5 MG/1
0.5 TABLET ORAL ONCE
Status: COMPLETED | OUTPATIENT
Start: 2024-08-08 | End: 2024-08-08

## 2024-08-08 RX ORDER — FUROSEMIDE 20 MG
TABLET ORAL
COMMUNITY
Start: 2024-07-06

## 2024-08-08 RX ORDER — CLOPIDOGREL BISULFATE 75 MG/1
75 TABLET ORAL DAILY
COMMUNITY
Start: 2024-05-15

## 2024-08-08 RX ORDER — SPIRONOLACTONE 25 MG/1
1 TABLET ORAL DAILY
COMMUNITY
Start: 2024-07-11

## 2024-08-08 RX ORDER — METOPROLOL SUCCINATE 50 MG/1
TABLET, EXTENDED RELEASE ORAL
COMMUNITY
Start: 2024-08-06

## 2024-08-08 RX ORDER — ALBUTEROL SULFATE 0.83 MG/ML
2.5 SOLUTION RESPIRATORY (INHALATION)
COMMUNITY
Start: 2024-07-31

## 2024-08-08 RX ORDER — AMLODIPINE BESYLATE 5 MG/1
1 TABLET ORAL 2 TIMES DAILY
COMMUNITY
Start: 2024-04-08

## 2024-08-08 RX ORDER — GUAIFENESIN 600 MG/1
TABLET, EXTENDED RELEASE ORAL
COMMUNITY
Start: 2024-06-26

## 2024-08-08 RX ORDER — ESCITALOPRAM OXALATE 10 MG/1
10 TABLET ORAL DAILY
COMMUNITY

## 2024-08-08 RX ORDER — FUROSEMIDE 40 MG
1 TABLET ORAL DAILY
COMMUNITY
Start: 2024-06-10

## 2024-08-08 RX ADMIN — LORAZEPAM 0.5 MG: 0.5 TABLET ORAL at 17:40

## 2024-08-08 RX ADMIN — AMLODIPINE BESYLATE 5 MG: 5 TABLET ORAL at 18:15

## 2024-08-08 SDOH — SOCIAL STABILITY: SOCIAL INSECURITY: HOW OFTEN DOES ANYONE, INCLUDING FAMILY AND FRIENDS, INSULT OR TALK DOWN TO YOU?: NEVER

## 2024-08-08 SDOH — SOCIAL STABILITY: SOCIAL INSECURITY: HOW OFTEN DOES ANYONE, INCLUDING FAMILY AND FRIENDS, THREATEN YOU WITH HARM?: NEVER

## 2024-08-08 SDOH — SOCIAL STABILITY: SOCIAL INSECURITY: HOW OFTEN DOES ANYONE, INCLUDING FAMILY AND FRIENDS, PHYSICALLY HURT YOU?: NEVER

## 2024-08-08 SDOH — SOCIAL STABILITY: SOCIAL INSECURITY: HOW OFTEN DOES ANYONE, INCLUDING FAMILY AND FRIENDS, SCREAM OR CURSE AT YOU?: NEVER

## 2024-08-08 ASSESSMENT — PAIN SCALES - GENERAL: PAINLEVEL_OUTOF10: 0

## 2024-09-06 ENCOUNTER — HOSPITAL ENCOUNTER (EMERGENCY)
Age: 89
Discharge: HOME | End: 2024-09-07
Attending: EMERGENCY MEDICINE

## 2024-09-06 DIAGNOSIS — S01.01XA LACERATION OF SCALP, INITIAL ENCOUNTER: Primary | ICD-10-CM

## 2024-09-06 PROCEDURE — 99282 EMERGENCY DEPT VISIT SF MDM: CPT

## 2024-09-06 PROCEDURE — 12001 RPR S/N/AX/GEN/TRNK 2.5CM/<: CPT | Performed by: EMERGENCY MEDICINE

## 2024-09-07 VITALS
DIASTOLIC BLOOD PRESSURE: 86 MMHG | TEMPERATURE: 97.8 F | OXYGEN SATURATION: 97 % | SYSTOLIC BLOOD PRESSURE: 147 MMHG | RESPIRATION RATE: 23 BRPM | HEART RATE: 106 BPM